# Patient Record
Sex: MALE | Race: WHITE | Employment: FULL TIME | URBAN - METROPOLITAN AREA
[De-identification: names, ages, dates, MRNs, and addresses within clinical notes are randomized per-mention and may not be internally consistent; named-entity substitution may affect disease eponyms.]

---

## 2018-08-10 ENCOUNTER — HOSPITAL ENCOUNTER (OUTPATIENT)
Dept: PHYSICAL THERAPY | Age: 63
Discharge: HOME | End: 2018-08-10
Payer: COMMERCIAL

## 2018-08-10 PROCEDURE — 97161 PT EVAL LOW COMPLEX 20 MIN: CPT | Performed by: PHYSICAL THERAPIST

## 2018-08-10 PROCEDURE — 97535 SELF CARE MNGMENT TRAINING: CPT | Performed by: PHYSICAL THERAPIST

## 2018-08-10 NOTE — PROGRESS NOTES
..[address] [place] [address] PHYSICAL THERAPY   [facility], [place] [address],[facility], [facility] - Phone: [phone]  Fax: [phone] OF CARE / [facility]  Patient Name: Saima Saleem : 1955   Medical   Diagnosis: L RCR Treatment Diagnosis: Left shoulder pain [M25.512]   Onset Date: 18     Referral Source: Citizen of the [place] [facility]): 8/10/2018   Prior Hospitalization: See medical history Provider #: [id number]   Prior Level of Function: No limitations related to L shouler   Comorbidities: Dm, arthritis, high blood pressure   Medications: Verified on Patient Summary List   The Plan of Care and following information is based on the information from the initial evaluation.   ===========================================================================================  Assessment / key information:  58 RHD M arrives to clinic s/p massive RCR on 18. Reports ELYSSA fall on ice in 2018. Rates pain 5-10/10 depending on medication use. Demo understanding of post op protocol. Objective findings: wrist ext limited 15 degrees, elbow prom wnl, shoulder prom flex 42, abd 12, er neutral, ir abdomen. All other tests deferred. Incisions dry and intact with no red flags.  Will attempt PT to address problem list below in order to restore pt overall function.   ===========================================================================================  Eval Complexity: History MEDIUM  Complexity : 1-2 comorbidities / personal factors will impact the outcome/ POC ;  Examination  HIGH Complexity : 4+ Standardized tests and measures addressing body structure, function, activity limitation and / or participation in recreation ; Presentation LOW Complexity : Stable, uncomplicated ;  Decision Making HIGH Complexity : FOTO score of 1- 25 ; Overall Complexity LOW   Problem List: pain affecting function, decrease ROM, decrease strength, decrease ADL/ functional abilitiies, decrease activity tolerance, decrease flexibility/ joint mobility and decrease transfer abilities   Treatment Plan may include any combination of the following: Therapeutic exercise, Therapeutic activities, Neuromuscular re-education, Physical agent/modality, Manual therapy, Patient education and Self Care training  Patient / Family readiness to learn indicated by: asking questions, trying to perform skills and interest  Persons(s) to be included in education: patient (P) and family support person (FSP);list wife  Barriers to Learning/Limitations: None  Measures taken:    Patient Goal (s): Decrease pain, restore use of shoulder   Patient self reported health status: good  Rehabilitation Potential: good   Short Term Goals: To be accomplished in  2  weeks:  1. Pt will be compliant with hep  2. Pt will be able to don/doff sling I  3. Pt will note daily max pain </=8/10 in order to increase participation in  [address]: To be accomplished in  4  weeks:  1. Pt will increase er prom 25 degrees to A with adls  2. Pt will increase prom elevation 90 degrees to A with adls  3. Pt will increase FOTO by 12 points in order to show functional improvement   Frequency / Duration:   Patient to be seen  1  times per week for 4  weeks:  Patient / Caregiver education and instruction: self care, activity modification, brace/ splint application and exercises  G-Codes (GP): benny  Therapist Signature: Erik Clarke DPT, [facility] Date: 6/29/4500   Certification Period: na Time: 11:59 AM   ===========================================================================================  I certify that the above Physical Therapy Services are being furnished while the patient is under my care. I agree with the treatment plan and certify that this therapy is necessary.     Physician Signature:        Date:       Time:     Please sign and return to [facility]. or you may fax the signed copy to [phone]. Thank you.

## 2018-08-10 NOTE — PROGRESS NOTES
[facility] PHYSICAL THERAPY - DAILY TREATMENT NOTE    Patient Name: Krishna Castellanos        Date: 8/10/2018  : 1955   yes Patient  Verified  Visit #:     Insurance: Payor: Sandoval Short / Plan: [employer] [place] HMO / Product Type: HMO /      In time: 940 Out time: 1025   Total Treatment Time: 45     Medicare/BCBS Time Tracking (below)   Total Timed Codes (min):  na 1:1 Treatment Time:  a     TREATMENT AREA =  Left shoulder pain [M25.512]    SUBJECTIVE  Pain Level (on 0 to 10 scale):  5  / 10   Medication Changes/New allergies or changes in medical history, any new surgeries or procedures?    no  If yes, update Summary List   Subjective Functional Status/Changes:  []  No changes reported     See ie          OBJECTIVE         min Patient Education:  yes  Reviewed HEP   []  Progressed/Changed HEP based on: Other Objective/Functional Measures:    Sc: reviewed hep, wound care and don/doff sling  See ie     Post Treatment Pain Level (on 0 to 10) scale:   5  / 10     ASSESSMENT  Assessment/Changes in Function:     See ie     []  See Progress Note/Recertification   Patient will continue to benefit from skilled PT services to modify and progress therapeutic interventions, address functional mobility deficits, address ROM deficits, address strength deficits, analyze and address soft tissue restrictions, analyze and cue movement patterns, analyze and modify body mechanics/ergonomics, assess and modify postural abnormalities and instruct in home and community integration to attain remaining goals.    Progress toward goals / Updated goals:    See ie     PLAN  []  Upgrade activities as tolerated yes Continue plan of care   []  Discharge due to :    []  Other:      Therapist: Amol Light PT    Date: 8/10/2018 Time: 11:58 AM     Future Appointments  Date Time Provider Alex Bailey   2018 9:00 AM [address] [place] [address], PT [facility]   2018 2:00 PM Amol Light PT [facility]   2018 11:00 AM [address] [place] [address], PT [facility]   9/5/2018 10:00 AM Moni Sepulveda, PT [facility]

## 2018-08-16 ENCOUNTER — HOSPITAL ENCOUNTER (OUTPATIENT)
Dept: PHYSICAL THERAPY | Age: 63
Discharge: HOME | End: 2018-08-16
Payer: COMMERCIAL

## 2018-08-16 PROCEDURE — 97110 THERAPEUTIC EXERCISES: CPT

## 2018-08-16 PROCEDURE — 97140 MANUAL THERAPY 1/> REGIONS: CPT

## 2018-08-16 NOTE — PROGRESS NOTES
PHYSICAL THERAPY - DAILY TREATMENT NOTE    Patient Name: Criss Mcclendon        Date: 2018  : 1955   yes Patient  Verified  Visit #:   2   of   8  Insurance: Payor: Tejal Seth / Plan: Ronold Buerger PPO / Product Type: PPO /      In time: 8:50 Out time: 9:41   Total Treatment Time: 51     Medicare/[facility] Time Tracking (below)   Total Timed Codes (min):  NA 1:1 Treatment Time:  NA     TREATMENT AREA =  Left shoulder pain [M25.512]    SUBJECTIVE  Pain Level (on 0 to 10 scale):  3  / 10   Medication Changes/New allergies or changes in medical history, any new surgeries or procedures?    no  If yes, update Summary List   Subjective Functional Status/Changes:  []  No changes reported     Pt reports his shoulder has been sore but no significant sharp pain. Reports intermittent shooting pain down the L upper arm into the elbow. Reports compliance to HEP, post-op restrictions, and sling wear. Reports independence w/ doffing sling but requires assist from wife to doff. OBJECTIVE  Modalities Rationale:     decrease inflammation, decrease pain and increase tissue extensibility to improve patient's ability to complete self-care   min [] Estim, type/location:                                      []  att     []  unatt     []  w/US     []  w/ice    []  w/heat    min []  Mechanical Traction: type/lbs                   []  pro   []  sup   []  int   []  cont    []  before manual    []  after manual    min []  Ultrasound, settings/location:      min []  Iontophoresis w/ dexamethasone, location:                                               []  take home patch       []  in clinic   10/10 min [x]  Ice post  [x]  Heat  pre  location/position:  To the L shoulder in long sit    min []  Vasopneumatic Device, press/temp:     min []  Other:    [x] Skin assessment post-treatment (if applicable):    [x]  intact    []  redness- no adverse reaction     []redness  adverse reaction:        16 min Therapeutic Exercise:  [x]  See flow sheet   Rationale:      increase ROM to improve the patients ability to don/doff sling     15 min Manual Therapy: stm to L post cuff, ut, lev scap; inf/post ghj mob gr II, PROM flex, er w/in protocol   Rationale:      decrease pain, increase ROM and increase tissue extensibility to improve patient's ability to complete self-care     min Patient Education:  yes  Reviewed HEP   []  Progressed/Changed HEP based on: Other Objective/Functional Measures:    Sig ttp to post cuff and lev scap  Cueing to reduce guarding t/o manual tx and pendulums  Adjusted sling to improve elbow supprort     Post Treatment Pain Level (on 0 to 10) scale:   1  / 10     ASSESSMENT  Assessment/Changes in Function:     Pt reported dec pain post tx session. Advised cont of hep as rx and reviewed post op restrictions     []  See Progress Note/Recertification   Patient will continue to benefit from skilled PT services to modify and progress therapeutic interventions, address functional mobility deficits, address ROM deficits, address strength deficits, analyze and address soft tissue restrictions, analyze and cue movement patterns, analyze and modify body mechanics/ergonomics and instruct in home and community integration to attain remaining goals.    Progress toward goals / Updated goals:    Met STG #1, slow progress to STG #2,,      PLAN  []  Upgrade activities as tolerated yes Continue plan of care   []  Discharge due to :    []  Other:      Therapist: Fiona López PT    Date: 8/16/2018 Time: 8:54 AM     Future Appointments  Date Time Provider Alex Bailey   8/16/2018 9:00 AM Fiona López PT [facility]   8/22/2018 2:00 PM Caterina Bernard PT [facility]   8/30/2018 11:00 AM Fiona López PT [facility]   9/5/2018 10:00 AM Caterina Bernard, PT [facility]

## 2018-08-22 ENCOUNTER — HOSPITAL ENCOUNTER (OUTPATIENT)
Dept: PHYSICAL THERAPY | Age: 63
End: 2018-08-22
Payer: COMMERCIAL

## 2018-08-30 ENCOUNTER — APPOINTMENT (OUTPATIENT)
Dept: PHYSICAL THERAPY | Age: 63
End: 2018-08-30
Payer: COMMERCIAL

## 2018-09-05 ENCOUNTER — HOSPITAL ENCOUNTER (OUTPATIENT)
Dept: PHYSICAL THERAPY | Age: 63
Discharge: HOME | End: 2018-09-05
Payer: COMMERCIAL

## 2018-09-05 PROCEDURE — 97110 THERAPEUTIC EXERCISES: CPT | Performed by: PHYSICAL THERAPIST

## 2018-09-05 PROCEDURE — 97140 MANUAL THERAPY 1/> REGIONS: CPT | Performed by: PHYSICAL THERAPIST

## 2018-09-05 NOTE — PROGRESS NOTES
[facility] PHYSICAL THERAPY - DAILY TREATMENT NOTE Patient Name: Princess Cid        Date: 2018 : 1955   yes Patient  Verified Visit #:   3   of   8  Insurance: Payor: OPTIMA / [place]: [place] [employer] / Product Type: PPO / In time: 955 Out time: 1054 Total Treatment Time: 54 Medicare/[facility] Time Tracking (below) Total Timed Codes (min):  na 1:1 Treatment Time:  na  
 
TREATMENT AREA =  Left shoulder pain [M25.512] SUBJECTIVE Pain Level (on 0 to 10 scale):  2  / 10 Medication Changes/New allergies or changes in medical history, any new surgeries or procedures?    no  If yes, update Summary List  
Subjective Functional Status/Changes:  []  No changes reported I have been doing just the exercises from the IE and staying in my sling and I have not been feeling too bad. I can lay on my back in bed, shower myself and take my sling on and off. OBJECTIVE Modalities Rationale:     decrease inflammation, decrease pain and increase tissue extensibility to improve patient's ability to don/doff sling  
 min - Estim, type/location:    
                                 -  att     -  unatt     -  w/US     -  w/ice    -  w/heat  
 min -  Mechanical Traction: type/lbs -  pro   -  sup   -  int   -  cont    -  before manual    -  after manual  
 min -  Ultrasound, settings/location:    
 min -  Iontophoresis w/ dexamethasone, location:   
                                           -  take home patch       -  in clinic  
10 min [x]  Ice     [x]  Heat    location/position: Long sit  
 min []  Vasopneumatic Device, press/temp:   
 min []  Other:   
[x] Skin assessment post-treatment (if applicable):   
[x]  intact    []  redness- no adverse reaction    
[]redness  adverse reaction:     
 
10 min Therapeutic Exercise:  [x]  See flow sheet Rationale:      increase ROM to improve the patients ability to don/doff sling 25 min Manual Therapy: Wrist, elbow and shoulder prom per protocol, grade I post mob Rationale:      decrease pain, increase ROM, increase tissue extensibility and decrease trigger points to improve patient's ability to complete adls 
 
 min Patient Education:  yes  Reviewed HEP []  Progressed/Changed HEP based on: Other Objective/Functional Measures: Mh, mt, te followed by cp Pt able to lay supine with towel under elbow for mt - achieved full elbow ext without pain Cues to maintain codman without excessive swing and able to achieve Post Treatment Pain Level (on 0 to 10) scale:   1  / 10 ASSESSMENT Assessment/Changes in Function:  
 
Continues to maintain good prom these stage post op 
  
[]  See Progress Note/Recertification Patient will continue to benefit from skilled PT services to modify and progress therapeutic interventions, address functional mobility deficits, address ROM deficits, address strength deficits, analyze and address soft tissue restrictions, analyze and cue movement patterns, analyze and modify body mechanics/ergonomics, assess and modify postural abnormalities and instruct in home and community integration to attain remaining goals. Progress toward goals / Updated goals: 
 
Progress towards ltg #1 PLAN 
[]  Upgrade activities as tolerated yes Continue plan of care  
[]  Discharge due to :   
[]  Other:   
 
Therapist: Rajan Smith, PT Date: 9/5/2018 Time: 10:47 AM  
 
Future Appointments Date Time Provider Alex Bailey 9/12/2018 9:30 AM Rajan Smith PT [address] [place] [address]  
9/19/2018 10:00 AM DAYLIN [facility]  
9/26/2018 10:00 AM Rajan Smith PT [address] [place] [address]

## 2018-09-12 ENCOUNTER — HOSPITAL ENCOUNTER (OUTPATIENT)
Dept: PHYSICAL THERAPY | Age: 63
Discharge: HOME | End: 2018-09-12
Payer: COMMERCIAL

## 2018-09-12 PROCEDURE — 97110 THERAPEUTIC EXERCISES: CPT | Performed by: PHYSICAL THERAPIST

## 2018-09-12 PROCEDURE — 97140 MANUAL THERAPY 1/> REGIONS: CPT | Performed by: PHYSICAL THERAPIST

## 2018-09-12 NOTE — PROGRESS NOTES
[facility] PHYSICAL THERAPY - DAILY TREATMENT NOTE Patient Name: Alvin Beach        Date: 2018 : 1955   yes Patient  Verified Visit #:   4   of   8  Insurance: Payor: OPTIMA / [place]: [place] [employer] / Product Type: PPO / In time: 930 Out time: 1028 Total Treatment Time: 46 Medicare/[facility] Time Tracking (below) Total Timed Codes (min):  na 1:1 Treatment Time:  na  
 
TREATMENT AREA =  Left shoulder pain [M25.512] SUBJECTIVE Pain Level (on 0 to 10 scale):  1  / 10 Medication Changes/New allergies or changes in medical history, any new surgeries or procedures?    no  If yes, update Summary List  
Subjective Functional Status/Changes:  []  No changes reported See pn OBJECTIVE Modalities Rationale:     decrease inflammation, decrease pain and increase tissue extensibility to improve patient's ability to don/doff sling  
 min - Estim, type/location:    
                                 -  att     -  unatt     -  w/US     -  w/ice    -  w/heat  
 min -  Mechanical Traction: type/lbs -  pro   -  sup   -  int   -  cont    -  before manual    -  after manual  
 min -  Ultrasound, settings/location:    
 min -  Iontophoresis w/ dexamethasone, location:   
                                           -  take home patch       -  in clinic  
10 min [x]  Ice     [x]  Heat    location/position: Long sit  
 min []  Vasopneumatic Device, press/temp:   
 min []  Other:   
[x] Skin assessment post-treatment (if applicable):   
[x]  intact    []  redness- no adverse reaction    
[]redness  adverse reaction:     
 
9 min Therapeutic Exercise:  [x]  See flow sheet Rationale:      increase ROM to improve the patients ability to don/doff sling 23 min Manual Therapy: Wrist, elbow and shoulder prom per protocol, grade I post mob Rationale:      decrease pain, increase ROM, increase tissue extensibility and decrease trigger points to improve patient's ability to complete adls min Patient Education:  yes  Reviewed HEP []  Progressed/Changed HEP based on: Other Objective/Functional Measures: 
 
See pn  
  
Post Treatment Pain Level (on 0 to 10) scale:   1  / 10 ASSESSMENT Assessment/Changes in Function:  
 
See pn  
  
[]  See Progress Note/Recertification Patient will continue to benefit from skilled PT services to modify and progress therapeutic interventions, address functional mobility deficits, address ROM deficits, address strength deficits, analyze and address soft tissue restrictions, analyze and cue movement patterns, analyze and modify body mechanics/ergonomics, assess and modify postural abnormalities and instruct in home and community integration to attain remaining goals. Progress toward goals / Updated goals: 
 
See pn  
 
PLAN 
[]  Upgrade activities as tolerated yes Continue plan of care  
[]  Discharge due to :   
[]  Other:   
 
Therapist: Kyle Arriaza PT Date: 9/12/2018 Time: 10:47 AM  
 
Future Appointments Date Time Provider Alex Bailey 9/19/2018 10:00 AM Kyle Arriaza, PT [address] [place] [address]  
9/26/2018 10:00 AM Kyle Arriaza, PT [address] [place] [address]  
9/28/2018 2:00 PM Kyle Arriaza PT [facility]  
10/1/2018 10:00 AM Kyle Arriaza, PT [facility]  
10/3/2018 10:00 AM Kyle Arriaza, PT [facility]  
10/8/2018 10:00 AM Kyle Arriaza, PT [facility]  
10/10/2018 10:00 AM Kyle Arriaza, PT [address] [place] [address]

## 2018-09-12 NOTE — PROGRESS NOTES
.[facility] [address] [facility] THERAPY 
[address],[facility], [address] [facility] [address] - Phone: [phone]  Fax: [phone] PROGRESS NOTE Patient Name: Erica Rodgers : 1955 Treatment/Medical Diagnosis: Left shoulder pain [M25.512] Referral Source: Ruby Gifford Date of Initial Visit: 8/10/18 Attended Visits: 4 Missed Visits: 0  
SUMMARY OF TREATMENT Pt has been seen for IE and 3 f/u visits with treatment consisting of therapeutic exercises, manual therapy and modalities prn. In addition pt and wife were instructed on brace fitting and hep. CURRENT STATUS Pt has made good progress with PT. PROM: flex 92 abd 65 er 29 ir abdomen, elbow arom full, wrist arom limited in ext 10 degrees all others wnl. All other tests deferred. Would like to continue with therapy into phase II of program at 6 week sabino. Goal/Measure of Progress Goal Met? 1. Pt will increase PROM er 25 degrees to A with adls Status at last Eval: neutral Current Status: 29 yes 2. Pt will increase prom elevation 90 degrees to A with adls Status at last Eval: 45 Current Status: 92 yes 3. Pt will increase FOTO by 12 points in order to show functional improvement Status at last Eval:  Current Status: 49/100 yes New Goals to be achieved in __4__  weeks: 1. Pt will increase prom elevation >/=120 to A with reaching 2. Pt will increase FOTO an additional 10 points in order to show functional improvement 3. Pt will increase shoulder arom elevation >/=75 degrees to A with reaching RECOMMENDATIONS Continue with therapy an additional 2x/4 weeks If you have any questions/comments please contact us directly at [phone]. Thank you for allowing us to assist in the care of your patient. Therapist Signature: Dami Lei DPT, [facility]  Date: 2018   Time: 8:08 AM  
NOTE TO PHYSICIAN:  PLEASE COMPLETE THE ORDERS BELOW AND FAX TO  
 [facility]: [phone] If you are unable to process this request in 24 hours please contact our office: [phone] 
 
___ I have read the above report and request that my patient continue as recommended.  
___ I have read the above report and request that my patient continue therapy with the following changes/special instructions:_________________________________________________________  
___ I have read the above report and request that my patient be discharged from therapy.   
 
Physician Signature:       Date:      Time:

## 2018-09-19 ENCOUNTER — HOSPITAL ENCOUNTER (OUTPATIENT)
Dept: PHYSICAL THERAPY | Age: 63
Discharge: HOME | End: 2018-09-19
Payer: COMMERCIAL

## 2018-09-19 PROCEDURE — 97140 MANUAL THERAPY 1/> REGIONS: CPT | Performed by: PHYSICAL THERAPIST

## 2018-09-19 PROCEDURE — 97110 THERAPEUTIC EXERCISES: CPT | Performed by: PHYSICAL THERAPIST

## 2018-09-19 NOTE — PROGRESS NOTES
[facility] PHYSICAL THERAPY - DAILY TREATMENT NOTE Patient Name: Padmini Leo        Date: 2018 : 1955   yes Patient  Verified Visit #:      of   16  Insurance: Payor: Coral Higuera / [place]: [place] PPO / Product Type: PPO / In time: 1000 Out time: 1055 Total Treatment Time: 54 Medicare/BCBS Time Tracking (below) Total Timed Codes (min):  na 1:1 Treatment Time:  na  
 
TREATMENT AREA =  Left shoulder pain [M25.512] SUBJECTIVE Pain Level (on 0 to 10 scale):  1  / 10 Medication Changes/New allergies or changes in medical history, any new surgeries or procedures?    no  If yes, update Summary List  
Subjective Functional Status/Changes:  []  No changes reported I saw the doctor and he was really impressed by my motion - gave me an additional 6 weeks of therapy, told me I can drive and to get rid of the sling. Last night I think I must have jolted in my sleep because I had pain in my arm that woke me up. It eventually went away OBJECTIVE Modalities Rationale:     decrease inflammation, decrease pain and increase tissue extensibility to improve patient's ability to don/doff sling  
 min - Estim, type/location:    
                                 -  att     -  unatt     -  w/US     -  w/ice    -  w/heat  
 min -  Mechanical Traction: type/lbs -  pro   -  sup   -  int   -  cont    -  before manual    -  after manual  
 min -  Ultrasound, settings/location:    
 min -  Iontophoresis w/ dexamethasone, location:   
                                           -  take home patch       -  in clinic  
10 min [x]  Ice     [x]  Heat    location/position: Long sit  
 min []  Vasopneumatic Device, press/temp:   
 min []  Other:   
[x] Skin assessment post-treatment (if applicable):   
[x]  intact    []  redness- no adverse reaction    
[]redness  adverse reaction:     
 
15 min Therapeutic Exercise:  [x]  See flow sheet Rationale:      increase ROM to improve the patients ability to don/doff sling 20 min Manual Therapy: Wrist, elbow and shoulder prom per protocol, grade I post mob Rationale:      decrease pain, increase ROM, increase tissue extensibility and decrease trigger points to improve patient's ability to complete adls 
 
 min Patient Education:  yes  Reviewed HEP []  Progressed/Changed HEP based on: Other Objective/Functional Measures: 
 
Performed te as per flow sheet followed by mt - at that time pt reported an increase in overall mm soreness - reviewed doms with pt who demo verbal understanding Post Treatment Pain Level (on 0 to 10) scale:   1  / 10 ASSESSMENT Assessment/Changes in Function: No increase in pain following exercise progression   
[]  See Progress Note/Recertification Patient will continue to benefit from skilled PT services to modify and progress therapeutic interventions, address functional mobility deficits, address ROM deficits, address strength deficits, analyze and address soft tissue restrictions, analyze and cue movement patterns, analyze and modify body mechanics/ergonomics, assess and modify postural abnormalities and instruct in home and community integration to attain remaining goals. Progress toward goals / Updated goals: 
 
Progressed to next phase in protocol and given an advanced hep PLAN 
[]  Upgrade activities as tolerated yes Continue plan of care  
[]  Discharge due to :   
[]  Other:   
 
Therapist: Sydni Fang PT Date: 9/19/2018 Time: 10:47 AM  
 
Future Appointments Date Time Provider Alex Bailey 9/26/2018 10:00 AM Sydni Fang PT [address] [place] [address]  
9/28/2018 2:00 PM Sydni Fang PT [facility]  
10/1/2018 10:00 AM Sydni Fang PT [facility]  
10/3/2018 10:00 AM Sydni Fang PT [facility]  
10/8/2018 10:00 AM Sydni Fang PT [facility]  
10/10/2018 10:00 AM Sydni Fang PT [address] [place] [address]

## 2018-09-26 ENCOUNTER — HOSPITAL ENCOUNTER (OUTPATIENT)
Dept: PHYSICAL THERAPY | Age: 63
Discharge: HOME | End: 2018-09-26
Payer: COMMERCIAL

## 2018-09-26 PROCEDURE — 97140 MANUAL THERAPY 1/> REGIONS: CPT | Performed by: PHYSICAL THERAPIST

## 2018-09-26 PROCEDURE — 97110 THERAPEUTIC EXERCISES: CPT | Performed by: PHYSICAL THERAPIST

## 2018-09-26 NOTE — PROGRESS NOTES
[facility] PHYSICAL THERAPY - DAILY TREATMENT NOTE Patient Name: Benito Porras        Date: 2018 : 1955   yes Patient  Verified Visit #:   6      16  Insurance: Payor: Mode Cooper / [place] / Product Type: PPO / In time: 955 Out time: 1050 Total Treatment Time: 54 Medicare/BCBS Time Tracking (below) Total Timed Codes (min):  na 1:1 Treatment Time:  na  
 
TREATMENT AREA =  Left shoulder pain [M25.512] SUBJECTIVE Pain Level (on 0 to 10 scale): 2  / 10 Medication Changes/New allergies or changes in medical history, any new surgeries or procedures?    no  If yes, update Summary List  
Subjective Functional Status/Changes:  []  No changes reported Just feels like a general ache especially after sleeping. I just cannot get comfortable at night OBJECTIVE Modalities Rationale:     decrease inflammation, decrease pain and increase tissue extensibility to improve patient's ability to don/doff sling  
 min - Estim, type/location:    
                                 -  att     -  unatt     -  w/US     -  w/ice    -  w/heat  
 min -  Mechanical Traction: type/lbs -  pro   -  sup   -  int   -  cont    -  before manual    -  after manual  
 min -  Ultrasound, settings/location:    
 min -  Iontophoresis w/ dexamethasone, location:   
                                           -  take home patch       -  in clinic  
10 min [x]  Ice     []  Heat    location/position: Long sit  
 min []  Vasopneumatic Device, press/temp:   
 min []  Other:   
[x] Skin assessment post-treatment (if applicable):   
[x]  intact    []  redness- no adverse reaction    
[]redness  adverse reaction:     
 
25 min Therapeutic Exercise:  [x]  See flow sheet Rationale:      increase ROM to improve the patients ability to don/doff sling 20 min Manual Therapy: Shoulder prom all directions, grade I-ii ghj post mob, dtm posterior cuff and ut Rationale:      decrease pain, increase ROM, increase tissue extensibility and decrease trigger points to improve patient's ability to complete adls 
 
 min Patient Education:  yes  Reviewed HEP []  Progressed/Changed HEP based on: Other Objective/Functional Measures: 
 
Increased aarom and prom as per protocol - guarding into all prom ir 
Palpable trp along mid ut and post cuff Post Treatment Pain Level (on 0 to 10) scale:   1  / 10 ASSESSMENT Assessment/Changes in Function:  
Pt advised on doms despite no increase in pain following progression. To f/U in clinic 48 hours and hold on any painful hep 
  
[]  See Progress Note/Recertification Patient will continue to benefit from skilled PT services to modify and progress therapeutic interventions, address functional mobility deficits, address ROM deficits, address strength deficits, analyze and address soft tissue restrictions, analyze and cue movement patterns, analyze and modify body mechanics/ergonomics, assess and modify postural abnormalities and instruct in home and community integration to attain remaining goals. Introduced mew exercises for progress towards established goal   
 
PLAN 
[]  Upgrade activities as tolerated yes Continue plan of care  
[]  Discharge due to :   
[]  Other:   
 
Therapist: Maryellen Thomas PT Date: 9/26/2018 Time: 10:47 AM  
 
Future Appointments Date Time Provider Alex Bailey 9/26/2018 10:00 AM Maryellen Thomas PT [facility]  
9/28/2018 2:00 PM Maryellen Thomas PT [facility]  
10/1/2018 10:00 AM Maryellen Thomas PT [facility]  
10/3/2018 10:00 AM Maryellen Thomas PT [facility]  
10/8/2018 10:00 AM Maryellen Thomas PT [facility]  
10/10/2018 10:00 AM Maryellen Thomas PT [facility]

## 2018-09-28 ENCOUNTER — HOSPITAL ENCOUNTER (OUTPATIENT)
Dept: PHYSICAL THERAPY | Age: 63
Discharge: HOME | End: 2018-09-28
Payer: COMMERCIAL

## 2018-09-28 PROCEDURE — 97140 MANUAL THERAPY 1/> REGIONS: CPT | Performed by: PHYSICAL THERAPIST

## 2018-09-28 PROCEDURE — 97110 THERAPEUTIC EXERCISES: CPT | Performed by: PHYSICAL THERAPIST

## 2018-09-28 NOTE — PROGRESS NOTES
[facility] PHYSICAL THERAPY - DAILY TREATMENT NOTE Patient Name: Augustin Durham        Date: 2018 : 1955   yes Patient  Verified Visit #:     Insurance: Payor: OPTIMA / Plan: [place] [employer] / Product Type: PPO / In time: 200 Out time: 255 Total Treatment Time: 54 Medicare/BCBS Time Tracking (below) Total Timed Codes (min):  na 1:1 Treatment Time:  na  
 
TREATMENT AREA =  Left shoulder pain [M25.512] SUBJECTIVE Pain Level (on 0 to 10 scale): 2  / 10 Medication Changes/New allergies or changes in medical history, any new surgeries or procedures?    no  If yes, update Summary List  
Subjective Functional Status/Changes:  []  No changes reported I have been just sore in my shoulder I think I did something in sleep OBJECTIVE Modalities Rationale:     decrease inflammation, decrease pain and increase tissue extensibility to improve patient's ability to don/doff sling  
 min - Estim, type/location:    
                                 -  att     -  unatt     -  w/US     -  w/ice    -  w/heat  
 min -  Mechanical Traction: type/lbs -  pro   -  sup   -  int   -  cont    -  before manual    -  after manual  
 min -  Ultrasound, settings/location:    
 min -  Iontophoresis w/ dexamethasone, location:   
                                           -  take home patch       -  in clinic  
10 min [x]  Ice     []  Heat    location/position: Long sit  
 min []  Vasopneumatic Device, press/temp:   
 min []  Other:   
[x] Skin assessment post-treatment (if applicable):   
[x]  intact    []  redness- no adverse reaction    
[]redness  adverse reaction:     
 
30 min Therapeutic Exercise:  [x]  See flow sheet Rationale:      increase ROM to improve the patients ability to don/doff sling 15 min Manual Therapy: Shoulder prom all directions, grade I-ii ghj post mob, dtm posterior cuff and ut Rationale:      decrease pain, increase ROM, increase tissue extensibility and decrease trigger points to improve patient's ability to complete adls 
 
 min Patient Education:  yes  Reviewed HEP []  Progressed/Changed HEP based on: Other Objective/Functional Measures: 
Arrived to session with pain reported along superior aspect of shoulder with elevation at 75 degrees, following manual approximately 120 pain free able to complete all te without c/o pain Post Treatment Pain Level (on 0 to 10) scale:   1  / 10 ASSESSMENT Assessment/Changes in Function:  
Progressing in all directions at nearly 8 weeks post op 
  
[]  See Progress Note/Recertification Patient will continue to benefit from skilled PT services to modify and progress therapeutic interventions, address functional mobility deficits, address ROM deficits, address strength deficits, analyze and address soft tissue restrictions, analyze and cue movement patterns, analyze and modify body mechanics/ergonomics, assess and modify postural abnormalities and instruct in home and community integration to attain remaining goals. Introduced mew exercises for progress towards established goal  
Progress towards goal  
 
PLAN 
[]  Upgrade activities as tolerated yes Continue plan of care  
[]  Discharge due to :   
[]  Other:   
 
Therapist: Mike Branch, PT Date: 9/28/2018 Time: 10:47 AM  
 
Future Appointments Date Time Provider Alex Bailey 10/1/2018 10:00 AM Mike Branch PT [address] [place] [address]  
10/3/2018 10:00 AM Mike Branch PT [address] [place] [address]  
10/8/2018 10:00 AM Mike Branch PT [facility]  
10/10/2018 10:00 AM Mike Branch PT [address] [place] [address]

## 2018-10-01 ENCOUNTER — HOSPITAL ENCOUNTER (OUTPATIENT)
Dept: PHYSICAL THERAPY | Age: 63
Discharge: HOME | End: 2018-10-01
Payer: COMMERCIAL

## 2018-10-01 PROCEDURE — 97110 THERAPEUTIC EXERCISES: CPT | Performed by: PHYSICAL THERAPIST

## 2018-10-01 PROCEDURE — 97140 MANUAL THERAPY 1/> REGIONS: CPT | Performed by: PHYSICAL THERAPIST

## 2018-10-01 NOTE — PROGRESS NOTES
[facility] PHYSICAL THERAPY - DAILY TREATMENT NOTE Patient Name: Corwin Nair        Date: 10/1/2018 : 1955   yes Patient  Verified Visit #:     Insurance: Payor: OPTIMA / Plan: [place] [employer] PPO / Product Type: PPO / In time: 1001 Out time: 1100 Total Treatment Time: 62 Medicare/BCBS Time Tracking (below) Total Timed Codes (min):  na 1:1 Treatment Time:  na  
 
TREATMENT AREA =  Left shoulder pain [M25.512] SUBJECTIVE Pain Level (on 0 to 10 scale):  1  / 10 Medication Changes/New allergies or changes in medical history, any new surgeries or procedures?    no  If yes, update Summary List  
Subjective Functional Status/Changes:  []  No changes reported I am feeling much better than I did on Friday. I was just really really sore. I did try and vacuum over the weekend and I realized that my shoulder is not ready for that OBJECTIVE Modalities Rationale:     decrease inflammation, decrease pain and increase tissue extensibility to improve patient's ability to don/doff sling  
 min - Estim, type/location:    
                                 -  att     -  unatt     -  w/US     -  w/ice    -  w/heat  
 min -  Mechanical Traction: type/lbs -  pro   -  sup   -  int   -  cont    -  before manual    -  after manual  
 min -  Ultrasound, settings/location:    
 min -  Iontophoresis w/ dexamethasone, location:   
                                           -  take home patch       -  in clinic  
10 min [x]  Ice     []  Heat    location/position: Long sit  
 min []  Vasopneumatic Device, press/temp:   
 min []  Other:   
[x] Skin assessment post-treatment (if applicable):   
[x]  intact    []  redness- no adverse reaction    
[]redness  adverse reaction:     
 
27 min Therapeutic Exercise:  [x]  See flow sheet Rationale:      increase ROM to improve the patients ability to don/doff sling 20 min Manual Therapy: dtm post cuff, grade ii-iii post mob, gentle long axis tx, ghj prom all directions Rationale:      decrease pain, increase ROM, increase tissue extensibility and decrease trigger points to improve patient's ability to complete adls 
 
 min Patient Education:  yes  Reviewed HEP []  Progressed/Changed HEP based on: Other Objective/Functional Measures: Added in both aarom and prom ir - aarom to sacral apex and prom sacral base with anterior pain reported at end range Completed remaining te without c/o Post Treatment Pain Level (on 0 to 10) scale:   0  / 10 ASSESSMENT Assessment/Changes in Function:  
Continuing to progressing rotational prom and aarom flex at 7 weeks post op  
  
[]  See Progress Note/Recertification Patient will continue to benefit from skilled PT services to modify and progress therapeutic interventions, address functional mobility deficits, address ROM deficits, address strength deficits, analyze and address soft tissue restrictions, analyze and cue movement patterns, analyze and modify body mechanics/ergonomics, assess and modify postural abnormalities and instruct in home and community integration to attain remaining goals. Progress toward goals / Updated goals: 
Steady progress towards elevation prom goals - will add in arom per protocol to address ltg #3 PLAN 
[]  Upgrade activities as tolerated yes Continue plan of care  
[]  Discharge due to :   
[]  Other:   
 
Therapist: Nanda Dangelo PT Date: 10/1/2018 Time: 10:47 AM  
 
Future Appointments Date Time Provider Alex Bailey 10/1/2018 10:00 AM Nanda Dangelo PT [address] [place] [address]  
10/3/2018 10:00 AM Nanda Dangelo PT [address] [place] [address]  
10/8/2018 10:00 AM DAYLIN Wellington [facility]  
10/10/2018 10:00 AM Nanda Dangelo PT [address] [place] [address]

## 2018-10-03 ENCOUNTER — HOSPITAL ENCOUNTER (OUTPATIENT)
Dept: PHYSICAL THERAPY | Age: 63
Discharge: HOME | End: 2018-10-03
Payer: COMMERCIAL

## 2018-10-03 PROCEDURE — 97140 MANUAL THERAPY 1/> REGIONS: CPT | Performed by: PHYSICAL THERAPIST

## 2018-10-03 PROCEDURE — 97110 THERAPEUTIC EXERCISES: CPT | Performed by: PHYSICAL THERAPIST

## 2018-10-03 NOTE — PROGRESS NOTES
.. PHYSICAL THERAPY - DAILY TREATMENT NOTE Patient Name: Augustin Durham        Date: 10/3/2018 : 1955   yes Patient  Verified Visit #:     Insurance: Payor: OPTIMA / Plan: [place] OPT[place] PPO / Product Type: PPO / In time: 958 Out time: 5048 Total Treatment Time: 62 Medicare/[facility] Time Tracking (below) Total Timed Codes (min):   1:1 Treatment Time:    
TREATMENT AREA =  Left shoulder pain [M25.512] SUBJECTIVE Pain Level (on 0 to 10 scale):  2  / 10 Medication Changes/New allergies or changes in medical history, any new surgeries or procedures?    no  If yes, update Summary List  
Subjective Functional Status/Changes:  []  No changes reported I had a rough night, I woke up and was lying on that shoulder. So today I am a little sore OBJECTIVE Modalities Rationale:     decrease pain and increase tissue extensibility to improve patient's ability to perform ADLs. min [] Estim, type/location:   
                                 []  att     []  unatt     []  w/US     []  w/ice    []  w/heat 
 min []  Mechanical Traction: type/lbs   
               []  pro   []  sup   []  int   []  cont    []  before manual    []  after manual  
 min []  Ultrasound, settings/location:    
 min []  Iontophoresis w/ dexamethasone, location:   
                                           []  take home patch       []  in clinic  
10 min [x]  Ice     []  Heat    location/position: sitting  
 min []  Vasopneumatic Device, press/temp:   
 min []  Other:   
28 min Therapeutic Exercise:  [x]  See flow sheet Rationale:      increase ROM and increase strength to improve the patients ability to perform ADLs. 20 min Manual Therapy: PROM (flexion, ER, abduction) sidelying IR Rationale:      decrease pain, improve ROM, improve tissue extensibility to improve patient's ability to perform ADLs. min Patient Education:  yes  Reviewed HEP []  Progressed/Changed HEP based on: Other Objective/Functional Measures: 
 
Noted UT/LS compensation with standing flexion in the scapular plane. Noted observational improvement in AROM flexion and ER. Post Treatment Pain Level (on 0 to 10) scale:   1  / 10 ASSESSMENT Assessment/Changes in Function:  
 
Added exercises to further improve AROM in flexion, ER and IR. Pt continuing to have most difficulty with IR in sidelying. []  See Progress Note/Recertification Patient will continue to benefit from skilled PT services to modify and progress therapeutic interventions, address functional mobility deficits, address ROM deficits, address strength deficits, analyze and address soft tissue restrictions and analyze and cue movement patterns to attain remaining goals. Progress toward goals / Updated goals: 
Progress per protocol in order to improve AROM. PLAN 
[]  Upgrade activities as tolerated yes Continue plan of care  
[]  Discharge due to :   
[]  Other:   
 
Therapist: DE Kent DPT, CMT Date: 10/3/2018 Time: 10:00 AM  
 
Future Appointments Date Time Provider Alex Bailey 10/8/2018 10:00 AM Lizandro Clarke, PT [address] [place] [address]  
10/10/2018 10:00 AM Lizandro Clarke, PT [facility]  
10/16/2018 12:30 PM Jackelineona Vince, PT [facility]  
10/18/2018 2:00 PM Jackelineona Greenwood, PT [facility]  
10/23/2018 12:00 PM Lavona Greenwood, PT [facility]  
10/25/2018 2:30 PM Jackelineona Greenwood, PT [facility]  
10/29/2018 2:30 PM Jackelineona Greenwood, PT [facility]  
10/31/2018 2:30 PM Lavona Greenwood, PT [facility]

## 2018-10-08 ENCOUNTER — HOSPITAL ENCOUNTER (OUTPATIENT)
Dept: PHYSICAL THERAPY | Age: 63
Discharge: HOME | End: 2018-10-08
Payer: COMMERCIAL

## 2018-10-08 PROCEDURE — 97140 MANUAL THERAPY 1/> REGIONS: CPT | Performed by: PHYSICAL THERAPIST

## 2018-10-08 PROCEDURE — 97110 THERAPEUTIC EXERCISES: CPT | Performed by: PHYSICAL THERAPIST

## 2018-10-08 NOTE — PROGRESS NOTES
.. PHYSICAL THERAPY - DAILY TREATMENT NOTE Patient Name: Margaret Smith        Date: 10/8/2018 : 1955   yes Patient  Verified Visit #:   10   of   16  Insurance: Payor: Channing Ocasio / Plan: [place] [employer] PPO / Product Type: PPO / In time: 959 Out time: 1059 Total Treatment Time: 60 Medicare/[facility] Time Tracking (below) Total Timed Codes (min):   1:1 Treatment Time:    
 
TREATMENT AREA =  Left shoulder pain [M25.512] SUBJECTIVE Pain Level (on 0 to 10 scale):  1  / 10 Medication Changes/New allergies or changes in medical history, any new surgeries or procedures?    no  If yes, update Summary List  
Subjective Functional Status/Changes:  []  No changes reported I was sore from changing a hose under the sink over the weekend. I had to put my arm in weird places but was cautious of how much I did. OBJECTIVE Modalities Rationale:     decrease pain and increase tissue extensibility to improve patient's ability to perform ADLs 
 min [] Estim, type/location:    
                                 []  att     []  unatt     []  w/US     []  w/ice    []  w/heat  
 min []  Mechanical Traction: type/lbs   
               []  pro   []  sup   []  int   []  cont    []  before manual    []  after manual  
 min []  Ultrasound, settings/location:    
 min []  Iontophoresis w/ dexamethasone, location:   
                                           []  take home patch       []  in clinic  
10 min [x]  Ice     []  Heat    location/position: sitting  
 min []  Vasopneumatic Device, press/temp:   
 min []  Other:   
 
 
35 min Therapeutic Exercise:  [x]  See flow sheet Rationale:      increase ROM, increase strength and improve coordination to improve the patients ability to perform ADLs. 15 min Manual Therapy: PROM (elevation, ER, ABER, sidelying IR) Rationale:      decrease pain, increase ROM and increase tissue extensibility to improve patient's ability to perform ADLs. min Patient Education:  yes  Reviewed HEP []  Progressed/Changed HEP based on: Other Objective/Functional Measures: 
 
Functional IR to L3 with anterior discomfort reported that is alleviated after stretch Post Treatment Pain Level (on 0 to 10) scale:   1  / 10 ASSESSMENT Assessment/Changes in Function:  
 
Observational improvement in ROM with elevation and UT compensation with standing scaption. Added seated body blade for rotator cuff strengthening and improve ability to perform ADLs. []  See Progress Note/Recertification Patient will continue to benefit from skilled PT services to modify and progress therapeutic interventions, address functional mobility deficits, address ROM deficits, address strength deficits, analyze and address soft tissue restrictions and analyze and cue movement patterns to attain remaining goals. Progress toward goals / Updated goals: 
 
Progress per protocol to meet ROM and functional goals. PLAN 
[]  Upgrade activities as tolerated yes Continue plan of care  
[]  Discharge due to :   
[]  Other:   
 
Therapist: DE Junior Date: 10/8/2018 Time: 10:43 AM  
 
Future Appointments Date Time Provider Alex Bailey 10/10/2018 10:00 AM Sarthak Hernández, PT [facility]  
10/16/2018 12:30 PM Sarthak Hernández, PT [facility]  
10/18/2018 2:00 PM Sarthak Hernández, PT [facility]  
10/23/2018 12:00 PM Sarthak Hernández, PT [facility]  
10/25/2018 2:30 PM Sarthak Hernández, PT [facility]  
10/29/2018 2:30 PM Sarthak Hernández, PT [facility]  
10/31/2018 2:30 PM Sarthak Hernández, PT [facility]

## 2018-10-10 ENCOUNTER — HOSPITAL ENCOUNTER (OUTPATIENT)
Dept: PHYSICAL THERAPY | Age: 63
Discharge: HOME | End: 2018-10-10
Payer: COMMERCIAL

## 2018-10-10 PROCEDURE — 97140 MANUAL THERAPY 1/> REGIONS: CPT | Performed by: PHYSICAL THERAPIST

## 2018-10-10 PROCEDURE — 97110 THERAPEUTIC EXERCISES: CPT | Performed by: PHYSICAL THERAPIST

## 2018-10-10 NOTE — PROGRESS NOTES
Cleve Robertson 31  [facility] THERAPY 
[address] [address],[facility], [address] [place] [address] - Phone: [phone]  Fax: [phone] PROGRESS NOTE Patient Name: Riccardo Francois : 1955 Treatment/Medical Diagnosis: Left shoulder pain [M25.512] Referral Source: Keith Leos Date of Initial Visit: 8/10/18 Attended Visits: 11 Missed Visits: 0  
SUMMARY OF TREATMENT Pt has been seen for 11 visits with progressing protocol. Pt is increasing overall rotator cuff recruitment through PROM, AROM, resistance exercises, manual therapy, and modalities. Still no resisted IR in ther ex. Incorporated rhythmic stabilization in elevation and abduction in order to promote increased rotator cuff recruitment/stability with antigravity activities. Pt still experiencing muscle guarding and excessive UT usage with scaption. CURRENT STATUS Pt has made good progress with therapy. AROM elevation 120, PROM elevation 135, functional IR L1, AROM ER 50. Pt reports having the most difficulty with abduction and IR. At this time would like to progress to next phase of protocol (exlcusing IR pres) to address arom/strength deficits Goal/Measure of Progress Goal Met? 1. Pt will increase PROM elevation >/= 120 to A with reaching. Status at last Eval: 92 Current Status: 135 yes 2. Pt will increase FOTO an additional 10 points in order to show functional improvement. Status at last Eval: 49/100 Current Status: 59/100 yes 3. Pt will increase shoulder AROM elevation >/= 75 degrees to A with reaching. Status at last Eval: Not tested Current Status: 120 n/a New Goals to be achieved in __4__  weeks: 1. Pt will increase IR ROM to >/= T12 in order to assist with dressing. 2. Pt will increase FOTO score by 12 points in order to show functional improvement. 3. Pt will increase AROM elevation >/= 120 without UT involvement in order to assist with ADLs. RECOMMENDATIONS Continue with therapy for an additional 2x/6 weeks. If you have any questions/comments please contact us directly at [phone]. Thank you for allowing us to assist in the care of your patient. Therapist Signature: DE Toney Date: 10/10/2018 Jennifer Cooper DPT, [facility] Time: 1:15 PM  
NOTE TO PHYSICIAN:  PLEASE COMPLETE THE ORDERS BELOW AND FAX TO [facility] Physical Therapy: [phone] If you are unable to process this request in 24 hours please contact our office: [phone] 
 
___ I have read the above report and request that my patient continue as recommended.  
___ I have read the above report and request that my patient continue therapy with the following changes/special instructions:_________________________________________________________  
___ I have read the above report and request that my patient be discharged from therapy.   
 
Physician Signature:       Date:      Time:

## 2018-10-10 NOTE — PROGRESS NOTES
.. PHYSICAL THERAPY - DAILY TREATMENT NOTE Patient Name: Jung Gross        Date: 10/10/2018 : 1955   yes Patient  Verified Visit #:     Insurance: Payor: Larry Debra / Plan: [place] PPO / Product Type: PPO / In time: 1003 Out time: 1100 Total Treatment Time: 62 Medicare/[facility] Time Tracking (below) Total Timed Codes (min):   1:1 Treatment Time:    
 
TREATMENT AREA =  Left shoulder pain [M25.512] SUBJECTIVE Pain Level (on 0 to 10 scale):  1  / 10 Medication Changes/New allergies or changes in medical history, any new surgeries or procedures?    no  If yes, update Summary List  
Subjective Functional Status/Changes:  []  No changes reported I feel fine, just a little sore/stiff. OBJECTIVE Modalities Rationale:     decrease inflammation and decrease pain to improve patient's ability to perform ADLs. min [] Estim, type/location:    
                                 []  att     []  unatt     []  w/US     []  w/ice    []  w/heat  
 min []  Mechanical Traction: type/lbs   
               []  pro   []  sup   []  int   []  cont    []  before manual    []  after manual  
 min []  Ultrasound, settings/location:    
 min []  Iontophoresis w/ dexamethasone, location:   
                                           []  take home patch       []  in clinic  
10 min [x]  Ice     []  Heat    location/position: sitting  
 min []  Vasopneumatic Device, press/temp:   
 min []  Other:   
 
 
32 min Therapeutic Exercise:  [x]  See flow sheet Rationale:      increase ROM and increase strength to improve the patients ability to perform ADLs. 15 min Manual Therapy: PROM IR/ER, elevation/rhythmic stabilization 3x30 sec in elevation and abduction Rationale:      decrease pain, increase ROM and increase tissue extensibility to improve patient's ability to perform ADLs. min Patient Education:  yes  Reviewed HEP []  Progressed/Changed HEP based on: Other Objective/Functional Measures: 
 
See pn 
  
Post Treatment Pain Level (on 0 to 10) scale:   1 / 10 ASSESSMENT Assessment/Changes in Function:  
 
Increasing per protocol. []  See Progress Note/Recertification Patient will continue to benefit from skilled PT services to modify and progress therapeutic interventions, address functional mobility deficits, address ROM deficits, address strength deficits, analyze and address soft tissue restrictions and analyze and cue movement patterns to attain remaining goals. Progress toward goals / Updated goals: 
 
Updated, see pn  
 
PLAN [x]  Upgrade activities as tolerated yes Continue plan of care  
[]  Discharge due to :   
[]  Other:   
 
Therapist: DE Pepper Date: 10/10/2018 Time: 2:04 PM  
 
Future Appointments Date Time Provider Alex Bailey 10/16/2018 12:30 PM Avani Borja, PT [address] [place] [address]  
10/18/2018 2:00 PM Avani Borja, PT [facility]  
10/23/2018 12:00 PM Avani Borja, PT [facility]  
10/25/2018 2:30 PM Avani Borja, PT [facility]  
10/29/2018 2:30 PM Avani Borja, PT [facility]  
10/31/2018 2:30 PM Avani Borja, PT [facility]  
11/2/2018 8:30 AM Avani Borja, PT [facility]  
11/7/2018 9:30 AM Avani Borja, PT [facility]  
11/9/2018 9:00 AM Avani Borja, PT [facility]  
11/13/2018 10:30 AM Avani Borja, PT [facility]  
11/15/2018 10:30 AM Avani Borja, PT [facility]  
11/19/2018 10:00 AM Avani Borja, PT [facility]  
11/21/2018 10:00 AM Avani Borja, PT [facility]  
11/26/2018 10:00 AM Avani Borja, PT [facility]  
11/28/2018 10:00 AM Avani Borja, PT [facility]

## 2018-10-16 ENCOUNTER — HOSPITAL ENCOUNTER (OUTPATIENT)
Dept: PHYSICAL THERAPY | Age: 63
Discharge: HOME | End: 2018-10-16
Payer: COMMERCIAL

## 2018-10-16 PROCEDURE — 97140 MANUAL THERAPY 1/> REGIONS: CPT | Performed by: PHYSICAL THERAPIST

## 2018-10-16 PROCEDURE — 97110 THERAPEUTIC EXERCISES: CPT | Performed by: PHYSICAL THERAPIST

## 2018-10-16 NOTE — PROGRESS NOTES
[facility] Sp[facility] PHYSICAL THERAPY - DAILY TREATMENT NOTE Patient Name: Natan Christianson        Date: 10/16/2018 : 1955   yes Patient  Verified Visit #:   15   of   25  Insurance: Payor: Elaine Zheng / Plan: [place] PPO / Product Type: PPO / In time: 1229 Out time: 115 Total Treatment Time: 45 Medicare/[facility] Time Tracking (below) Total Timed Codes (min):  na 1:1 Treatment Time:  na  
TREATMENT AREA =  Left shoulder pain [M25.512] SUBJECTIVE Pain Level (on 0 to 10 scale):  1  / 10 Medication Changes/New allergies or changes in medical history, any new surgeries or procedures?    no  If yes, update Summary List  
Subjective Functional Status/Changes:  []  No changes reported I did a lot over the weekend so I have a lot of just mm soreness OBJECTIVE 30 min Therapeutic Exercise:  [x]  See flow sheet Rationale:      increase ROM and increase strength to improve the patients ability to complete adls 15 min Manual Therapy: Dtm/stretch posterior cuff, ghj prom all directions, grade iii post mob Rationale:      decrease pain, increase ROM, increase tissue extensibility and decrease trigger points to improve patient's ability to lift/reach  
 min Patient Education:  yes  Reviewed HEP []  Progressed/Changed HEP based on: Other Objective/Functional Measures: 
 
Increased te as per flow sheet to improve pt ability to reach/lift - continuing to hold on resisted ir until cleared by md 
ttp along posterior cuff with palpable trp at mid teres Post Treatment Pain Level (on 0 to 10) scale:   1  / 10 ASSESSMENT Assessment/Changes in Function: No increase in pain/discomfort following progression - continues to UT hike any elevation attempts 120 []  See Progress Note/Recertification Patient will continue to benefit from skilled PT services to modify and progress therapeutic interventions, address functional mobility deficits, address ROM deficits, address strength deficits, analyze and address soft tissue restrictions, analyze and cue movement patterns, analyze and modify body mechanics/ergonomics, assess and modify postural abnormalities and instruct in home and community integration to attain remaining goals. Progress toward goals / Updated goals: 
Continues to make progress towards established goals PLAN 
[]  Upgrade activities as tolerated yes Continue plan of care  
[]  Discharge due to :   
[]  Other:   
 
Therapist: Lacey Adan PT Date: 10/16/2018 Time: 1:00 PM  
 
Future Appointments Date Time Provider Alex Bailey 10/18/2018 2:00 PM Lacey Adan, PT [facility]  
10/23/2018 12:00 PM Lacey Adan, PT [facility]  
10/25/2018 2:30 PM Lacey Adan, PT [facility]  
10/29/2018 2:30 PM Lacey Adan, PT [facility]  
10/31/2018 2:30 PM Lacey Adan, PT [facility]  
11/2/2018 8:30 AM Lacey Adan, PT [facility]  
11/7/2018 9:30 AM Lacey Adan, PT [facility]  
11/9/2018 9:00 AM Lacey Adan, PT [facility]  
11/13/2018 10:30 AM Lacey Adan, PT [facility]  
11/15/2018 10:30 AM Lacey Adan, PT [facility]  
11/19/2018 10:00 AM Lacey Adan, PT [facility]  
11/21/2018 10:00 AM Lacey Adan, PT [facility]  
11/26/2018 10:00 AM Lacey Adan, PT [facility]  
11/28/2018 10:00 AM Lacey Adan, PT [facility]

## 2018-10-18 ENCOUNTER — HOSPITAL ENCOUNTER (OUTPATIENT)
Dept: PHYSICAL THERAPY | Age: 63
Discharge: HOME | End: 2018-10-18
Payer: COMMERCIAL

## 2018-10-18 PROCEDURE — 97140 MANUAL THERAPY 1/> REGIONS: CPT | Performed by: PHYSICAL THERAPIST

## 2018-10-18 PROCEDURE — 97110 THERAPEUTIC EXERCISES: CPT | Performed by: PHYSICAL THERAPIST

## 2018-10-18 NOTE — PROGRESS NOTES
.. PHYSICAL THERAPY - DAILY TREATMENT NOTE Patient Name: Shira Melgar        Date: 10/18/2018 : 1955   yes Patient  Verified Visit #:   15   of   18  Insurance: Payor: Bud Arceo / Plan: [place] OPT[place] PPO / Product Type: PPO / In time: 205 Out time: 300 Total Treatment Time: 54 Medicare/[facility] Time Tracking (below) Total Timed Codes (min):  na 1:1 Treatment Time:  na  
TREATMENT AREA =  Left shoulder pain [M25.512] SUBJECTIVE Pain Level (on 0 to 10 scale):  0  / 10 Medication Changes/New allergies or changes in medical history, any new surgeries or procedures?    no  If yes, update Summary List  
Subjective Functional Status/Changes:  []  No changes reported I am just sore from increasing the stuff after last visit. OBJECTIVE 40 min Therapeutic Exercise:  [x]  See flow sheet Rationale:      increase ROM and increase strength to improve the patients ability to perform ADLs. 15 min Manual Therapy: ghj inferior mobs, ER and elevation PROM, STM posterior cuff Rationale:      decrease pain, increase ROM and increase tissue extensibility to improve patient's ability to perform ADLs. min Patient Education:  yes  Reviewed HEP []  Progressed/Changed HEP based on: Other Objective/Functional Measures: 
 
Improved elevation and ER ROM. Noted muscle tension along UT and posterior cuff. Added wall push ups to improve stability and RC strength. Post Treatment Pain Level (on 0 to 10) scale:   0  / 10 ASSESSMENT Assessment/Changes in Function:  
 
Pt is progressing well with PT, continues to have the most difficulty with IR. Will continue to hold resisted IR until cleared by MD.  
[]  See Progress Note/Recertification Patient will continue to benefit from skilled PT services to modify and progress therapeutic interventions, address functional mobility deficits, address ROM deficits, address strength deficits, analyze and address soft tissue restrictions and analyze and cue movement patterns to attain remaining goals. Progress toward goals / Updated goals: 
Progress in order to meet ROM goals. PLAN 
[]  Upgrade activities as tolerated yes Continue plan of care  
[]  Discharge due to :   
[]  Other:   
 
Therapist: DE Ervin Date: 10/18/2018 Time: 3:25 PM  
 
Future Appointments Date Time Provider Alex Bailey 10/23/2018 12:00 PM Kossekristi Beach, PT [facility]  
10/25/2018  2:30 PM Jenna Beach, PT [facility]  
10/29/2018  2:30 PM Jenna Beach, PT [facility]  
10/31/2018  2:30 PM Jenna Beach, PT [facility]  
11/2/2018  8:30 AM Jenna Beach, PT [facility]  
11/7/2018  9:30 AM Jenna Beach, PT [facility]  
11/9/2018  9:00 AM Jenna Beach, PT [facility]  
11/13/2018 10:30 AM Jenna Beach, PT [facility]  
11/15/2018 10:30 AM Jenna Beach, PT [facility]  
11/19/2018 10:00 AM Jenna Beach, PT [facility]  
11/21/2018 10:00 AM Jenna Beach, PT [facility]  
11/26/2018 10:00 AM Jenna Beach, PT [facility]  
11/28/2018 10:00 AM Franck Ferreira, PT [facility]

## 2018-10-23 ENCOUNTER — APPOINTMENT (OUTPATIENT)
Dept: PHYSICAL THERAPY | Age: 63
End: 2018-10-23
Payer: COMMERCIAL

## 2018-10-25 ENCOUNTER — HOSPITAL ENCOUNTER (OUTPATIENT)
Dept: PHYSICAL THERAPY | Age: 63
Discharge: HOME | End: 2018-10-25
Payer: COMMERCIAL

## 2018-10-25 PROCEDURE — 97140 MANUAL THERAPY 1/> REGIONS: CPT | Performed by: PHYSICAL THERAPIST

## 2018-10-25 PROCEDURE — 97110 THERAPEUTIC EXERCISES: CPT | Performed by: PHYSICAL THERAPIST

## 2018-10-25 NOTE — PROGRESS NOTES
.. PHYSICAL THERAPY - DAILY TREATMENT NOTE Patient Name: Zaki Ramirez        Date: 10/25/2018 : 1955   yes Patient  Verified Visit #:     Insurance: Payor: Ra Bautista / Plan: [place] OPT[place] PPO / Product Type: PPO / In time: 230 Out time: 321 Total Treatment Time: 51 Medicare/[facility] Time Tracking (below) Total Timed Codes (min):  na 1:1 Treatment Time:  na  
TREATMENT AREA =  Left shoulder pain [M25.512] SUBJECTIVE Pain Level (on 0 to 10 scale):  0  / 10 Medication Changes/New allergies or changes in medical history, any new surgeries or procedures?    no  If yes, update Summary List  
Subjective Functional Status/Changes:  []  No changes reported I am still sore. I only really have pain if I am sleeping at night and I end up turning on the L shoulder. OBJECTIVE 36 min Therapeutic Exercise:  [x]  See flow sheet Rationale:      increase ROM, increase strength, improve coordination and increase proprioception to improve the patients ability to perform ADLs. 15 min Manual Therapy: PROM elevation, IR, ER, inferior ghj mobs, posterior mobs Rationale:      decrease pain, increase ROM and increase tissue extensibility to improve patient's ability to perform ADLs. min Patient Education:  yes  Reviewed HEP []  Progressed/Changed HEP based on: Other Objective/Functional Measures: 
 
Improving global ghj ROM. ER: 61 degrees Minimal p! With 130 degrees flexion Post Treatment Pain Level (on 0 to 10) scale:   0  / 10 ASSESSMENT Assessment/Changes in Function: Pt progressing very well with PT and is tolerating increasing resistance with exercises. Still avoiding resisted IR until next visit 
  
[]  See Progress Note/Recertification Patient will continue to benefit from skilled PT services to modify and progress therapeutic interventions, address functional mobility deficits, address ROM deficits, address strength deficits, analyze and address soft tissue restrictions and analyze and cue movement patterns to attain remaining goals. Progress toward goals / Updated goals: 
Progress in order to return to PLOF  
 
PLAN 
[]  Upgrade activities as tolerated yes Continue plan of care  
[]  Discharge due to :   
[]  Other:   
 
Therapist: DE Pepper Date: 10/25/2018 Time: 2:27 PM  
 
Future Appointments Date Time Provider Alex Bailey 10/25/2018  2:30 PM Ganga Greenhouse, PT [facility]  
10/29/2018  2:30 PM Ganga Greenhouse, PT [facility]  
10/31/2018  2:30 PM Chester Epps [facility]  
11/2/2018  8:30 AM Ganga Greenhouse, PT [facility]  
11/7/2018  9:30 AM Ganga Greenhouse, PT [facility]  
11/9/2018  9:00 AM Ganga Greenhouse, PT [facility]  
11/13/2018 10:30 AM Ganga Greenhouse, PT [facility]  
11/15/2018 10:30 AM Ganga Greenhouse, PT [facility]  
11/19/2018 10:00 AM Ganga Greenhouse, PT [facility]  
11/21/2018 10:00 AM Ganga Greenhouse, PT [facility]  
11/26/2018 10:00 AM Ganga Greenhouse, PT [facility]  
11/28/2018 10:00 AM Pat Ferreira, PT [facility]

## 2018-10-29 ENCOUNTER — APPOINTMENT (OUTPATIENT)
Dept: PHYSICAL THERAPY | Age: 63
End: 2018-10-29
Payer: COMMERCIAL

## 2018-10-31 ENCOUNTER — HOSPITAL ENCOUNTER (OUTPATIENT)
Dept: PHYSICAL THERAPY | Age: 63
Discharge: HOME | End: 2018-10-31
Payer: COMMERCIAL

## 2018-10-31 PROCEDURE — 97140 MANUAL THERAPY 1/> REGIONS: CPT | Performed by: PHYSICAL THERAPIST

## 2018-10-31 PROCEDURE — 97110 THERAPEUTIC EXERCISES: CPT | Performed by: PHYSICAL THERAPIST

## 2018-10-31 NOTE — PROGRESS NOTES
.. PHYSICAL THERAPY - DAILY TREATMENT NOTE Patient Name: Riccardo Francois        Date: 10/31/2018 : 1955   yes Patient  Verified Visit #:   15   of   18  Insurance: Payor: OPTIMA / Plan: [place] OPTIMA PPO / Product Type: PPO / In time: 228 Out time: 320 Total Treatment Time: 50 Medicare/[facility] Time Tracking (below) Total Timed Codes (min):  na 1:1 Treatment Time:  na  
TREATMENT AREA =  Left shoulder pain [M25.512] SUBJECTIVE Pain Level (on 0 to 10 scale):  0  / 10 Medication Changes/New allergies or changes in medical history, any new surgeries or procedures?    no  If yes, update Summary List  
Subjective Functional Status/Changes:  []  No changes reported I saw the doctor the other day and he was really impressed with my shoulder. He said that my rom was more than he was expecting and he OBJECTIVE 35 min Therapeutic Exercise:  [x]  See flow sheet Rationale:      increase ROM, increase strength, improve coordination and increase proprioception to improve the patients ability to perform ADLs. 15 min Manual Therapy: PROM elevation, IR, ER, inferior ghj mobs, posterior mobs, subscapularis release Rationale:      decrease pain, increase ROM and increase tissue extensibility to improve patient's ability to perform ADLs. min Patient Education:  yes  Reviewed HEP []  Progressed/Changed HEP based on: Other Objective/Functional Measures: 
 
ttp along mid muscle belly of subscapularis at axilla Added in resisted ir with cues requiring for humeral head ocrrection Post Treatment Pain Level (on 0 to 10) scale:   0  / 10 ASSESSMENT Assessment/Changes in Function: Pt progressing very well with PT and is tolerating increasing resistance with exercises. Still avoiding resisted IR until next visit  
[]  See Progress Note/Recertification Patient will continue to benefit from skilled PT services to modify and progress therapeutic interventions, address functional mobility deficits, address ROM deficits, address strength deficits, analyze and address soft tissue restrictions and analyze and cue movement patterns to attain remaining goals. Progress toward goals / Updated goals: Will continue an additional month per md to increase pres PLAN 
[]  Upgrade activities as tolerated yes Continue plan of care  
[]  Discharge due to :   
[]  Other:   
 
Therapist: Lorenzo Rene PT, SPT Date: 10/31/2018 Time: 2:27 PM  
 
Future Appointments Date Time Provider Alex Bailey 11/2/2018  8:30 AM Magy Hoang, PT [facility]  
11/7/2018  9:30 AM Magy Hoang, PT [address] [place] [address]  
11/9/2018  9:00 AM Magy Hoang, PT [facility]  
11/13/2018 10:30 AM Magy Hoang, PT [facility]  
11/15/2018 10:30 AM Magy Hoang PT [facility]  
11/19/2018 10:00 AM Magy Hoang, PT [facility]  
11/21/2018 10:00 AM Magy Hoang, PT [facility]  
11/26/2018 10:00 AM Magy Hoang, PT [facility]  
11/28/2018 10:00 AM Brit Ferreira, PT [facility]

## 2018-11-02 ENCOUNTER — HOSPITAL ENCOUNTER (OUTPATIENT)
Dept: PHYSICAL THERAPY | Age: 63
Discharge: HOME | End: 2018-11-02
Payer: COMMERCIAL

## 2018-11-02 PROCEDURE — 97110 THERAPEUTIC EXERCISES: CPT | Performed by: PHYSICAL THERAPIST

## 2018-11-02 PROCEDURE — 97140 MANUAL THERAPY 1/> REGIONS: CPT | Performed by: PHYSICAL THERAPIST

## 2018-11-02 NOTE — PROGRESS NOTES
.. PHYSICAL THERAPY - DAILY TREATMENT NOTE Patient Name: Carson Fregoso        Date: 2018 : 1955   yes Patient  Verified Visit #:     Insurance: Payor: Larry Hannon / Plan: [place] OPTIMA PPO / Product Type: PPO / In time: 837 Out time: 535 Total Treatment Time: 62 Medicare/[facility] Time Tracking (below) Total Timed Codes (min):  na 1:1 Treatment Time:  na  
TREATMENT AREA =  Left shoulder pain [M25.512] SUBJECTIVE Pain Level (on 0 to 10 scale):  0  / 10 Medication Changes/New allergies or changes in medical history, any new surgeries or procedures?    no  If yes, update Summary List  
Subjective Functional Status/Changes:  []  No changes reported I feel good, I was moving light furniture in the house yesterday and I am not even sore after. It is also getting easier to sleep on my arm. OBJECTIVE 43 min Therapeutic Exercise:  [x]  See flow sheet Rationale:      increase ROM, increase strength and improve coordination to improve the patients ability to perform ADLs. 15 min Manual Therapy: PROM ER, IR, elevation, post and inf joint mobs, pec minor release Rationale:      decrease pain, increase ROM and increase tissue extensibility to improve patient's ability to perform ADLs. min Patient Education:  yes  Reviewed HEP []  Progressed/Changed HEP based on: Other Objective/Functional Measures: FIR: L3 
ttp on coracoid process and along pec minor muscle belly Post Treatment Pain Level (on 0 to 10) scale:   0  / 10 ASSESSMENT Assessment/Changes in Function:  
 
Pt is progressing well with increasing exercise intensity. Will progress with resisted IR per protocol. []  See Progress Note/Recertification Patient will continue to benefit from skilled PT services to modify and progress therapeutic interventions, address functional mobility deficits, address ROM deficits, address strength deficits, analyze and address soft tissue restrictions and analyze and cue movement patterns to attain remaining goals. Progress toward goals / Updated goals: 
Progress in order to return to PLOF FOT increased an additional 7 points since previous attempt PLAN 
[]  Upgrade activities as tolerated yes Continue plan of care  
[]  Discharge due to :   
[]  Other:   
 
Therapist: DE Garces Date: 11/2/2018 Time: 8:42 AM  
 
Future Appointments Date Time Provider Alex Bailey 11/7/2018  9:30 AM Nelsy Walker, PT [address] [place] [address]  
11/9/2018  9:00 AM Nelsy Walker, PT [facility]  
11/13/2018 10:30 AM Nelsy Walker, PT [facility]  
11/15/2018 10:30 AM Nelsy Walker, PT [facility]  
11/19/2018 10:00 AM Nelsy Walker, PT [facility]  
11/21/2018 10:00 AM Nelsy Walker, PT [facility]  
11/26/2018 10:00 AM Nelsy Walker, PT [facility]  
11/28/2018 10:00 AM Shun Ferreira, PT [facility]

## 2018-11-07 ENCOUNTER — HOSPITAL ENCOUNTER (OUTPATIENT)
Dept: PHYSICAL THERAPY | Age: 63
Discharge: HOME | End: 2018-11-07
Payer: COMMERCIAL

## 2018-11-07 PROCEDURE — 97110 THERAPEUTIC EXERCISES: CPT | Performed by: PHYSICAL THERAPIST

## 2018-11-07 PROCEDURE — 97140 MANUAL THERAPY 1/> REGIONS: CPT | Performed by: PHYSICAL THERAPIST

## 2018-11-07 NOTE — PROGRESS NOTES
.. PHYSICAL THERAPY - DAILY TREATMENT NOTE Patient Name: Katya Arias        Date: 2018 : 1955   yes Patient  Verified Visit #:     Insurance: Payor: OPTIMA / Plan: [place] OPTIMA PPO / Product Type: PPO / In time: 930 Out time: 1026 Total Treatment Time: 54 Medicare/[facility] Time Tracking (below) Total Timed Codes (min):  na 1:1 Treatment Time:  na  
TREATMENT AREA =  Left shoulder pain [M25.512] SUBJECTIVE Pain Level (on 0 to 10 scale):  0  / 10 Medication Changes/New allergies or changes in medical history, any new surgeries or procedures?    no  If yes, update Summary List  
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 15 min Manual Therapy: ghj prom all directions, dtm/release post cuff and pec, grade iii post mob Rationale:      decrease pain, increase ROM and increase tissue extensibility to improve patient's ability to perform ADLs. min Patient Education:  yes  Reviewed HEP []  Progressed/Changed HEP based on: Other Objective/Functional Measures: Added in multiple rtc te to address shoulder stability >/=90 degrees - fatigued with new te and noted 1/10 posterior lateral discomfort so held remaining te. After rest pt noted reduction in pain and left with 0/10 Post Treatment Pain Level (on 0 to 10) scale:   0  / 10 ASSESSMENT Assessment/Changes in Function:  
Continues to fatigue quickly and adapt ut/biceps compensation with lifting >90 degrees  
  
[]  See Progress Note/Recertification Patient will continue to benefit from skilled PT services to modify and progress therapeutic interventions, address functional mobility deficits, address ROM deficits, address strength deficits, analyze and address soft tissue restrictions and analyze and cue movement patterns to attain remaining goals. Progress toward goals / Updated goals: 
Continue to increase pres to address strength goals - making good progress towards all ltg at an expedited time frame PLAN 
[]  Upgrade activities as tolerated yes Continue plan of care  
[]  Discharge due to :   
[]  Other:   
 
Therapist: Tho Cox PT Date: 11/7/2018 Time: 8:42 AM  
 
Future Appointments Date Time Provider Alex Bailey 11/7/2018  9:30 AM Krysten Riddles, PT [facility]  
11/9/2018  9:00 AM Krysten Riddles, PT [facility]  
11/13/2018 10:30 AM Krysten Riddles, PT [facility]  
11/15/2018 10:30 AM Krysten Riddles, PT [facility]  
11/19/2018 10:00 AM Krysten Riddles, PT [facility]  
11/21/2018 10:00 AM Krysten Riddles, PT [facility]  
11/26/2018 10:00 AM Krysten Riddles, PT [facility]  
11/28/2018 10:00 AM Ted Ferreira, PT [facility]

## 2018-11-09 ENCOUNTER — HOSPITAL ENCOUNTER (OUTPATIENT)
Dept: PHYSICAL THERAPY | Age: 63
Discharge: HOME | End: 2018-11-09
Payer: COMMERCIAL

## 2018-11-09 PROCEDURE — 97110 THERAPEUTIC EXERCISES: CPT | Performed by: PHYSICAL THERAPIST

## 2018-11-09 PROCEDURE — 97140 MANUAL THERAPY 1/> REGIONS: CPT | Performed by: PHYSICAL THERAPIST

## 2018-11-09 NOTE — PROGRESS NOTES
.. PHYSICAL THERAPY - DAILY TREATMENT NOTE Patient Name: Grady Loza        Date: 2018 : 1955   yes Patient  Verified Visit #:     Insurance: Payor: OPTIMA / Plan: [place] P[place] / Product Type: PPO / In time: 905 Out time: 959 Total Treatment Time: 47 Medicare/[facility] Time Tracking (below) Total Timed Codes (min):  na 1:1 Treatment Time:  na  
TREATMENT AREA =  Left shoulder pain [M25.512] SUBJECTIVE Pain Level (on 0 to 10 scale):  0  / 10 Medication Changes/New allergies or changes in medical history, any new surgeries or procedures?    no  If yes, update Summary List  
Subjective Functional Status/Changes:  []  No changes reported I feel better than I did coming in to last visit. I was really busy that day and sore but it is better now. OBJECTIVE 39 min Therapeutic Exercise:  [x]  See flow sheet Rationale:      increase ROM, increase strength and improve coordination to improve the patients ability to perform ADLs. 15 min Manual Therapy: PROM all directions, STM post cuff Rationale:      decrease pain, increase ROM and increase tissue extensibility to improve patient's ability to perform ADLs. min Patient Education:  yes  Reviewed HEP []  Progressed/Changed HEP based on: Other Objective/Functional Measures: 
 
Improved elevation ROM with decreased UT compensation. F IR remains L5/sacrum, right UR F IR L1 Post Treatment Pain Level (on 0 to 10) scale:   0  / 10 ASSESSMENT Assessment/Changes in Function: Pt progressing well in therapy, will continue to promote ghj stabilization in order to return to PLOF. []  See Progress Note/Recertification Patient will continue to benefit from skilled PT services to modify and progress therapeutic interventions, address functional mobility deficits, address ROM deficits, address strength deficits, analyze and address soft tissue restrictions and analyze and cue movement patterns to attain remaining goals. Progress toward goals / Updated goals: 
Progress in order to return to [facility]. PLAN 
[]  Upgrade activities as tolerated yes Continue plan of care  
[]  Discharge due to :   
[]  Other:   
 
Therapist: Amanda Adan [facility] Date: 11/9/2018 Time: 8:51 AM  
 
Future Appointments Date Time Provider Alex Bialey 11/9/2018  9:00 AM Milana Li, PT [facility]  
11/13/2018 10:30 AM Milana Li, PT [facility]  
11/15/2018 10:30 AM Milana Li, PT [facility]  
11/19/2018 10:00 AM Milana Li, PT [facility]  
11/21/2018 10:00 AM Milana Li, PT [facility]  
11/26/2018 10:00 AM Milana Li, PT [facility]  
11/28/2018 10:00 AM Sindhu Ferreira, PT [facility]

## 2018-11-13 ENCOUNTER — HOSPITAL ENCOUNTER (OUTPATIENT)
Dept: PHYSICAL THERAPY | Age: 63
Discharge: HOME | End: 2018-11-13
Payer: COMMERCIAL

## 2018-11-13 PROCEDURE — 97140 MANUAL THERAPY 1/> REGIONS: CPT | Performed by: PHYSICAL THERAPIST

## 2018-11-13 PROCEDURE — 97110 THERAPEUTIC EXERCISES: CPT | Performed by: PHYSICAL THERAPIST

## 2018-11-13 NOTE — PROGRESS NOTES
.. PHYSICAL THERAPY - DAILY TREATMENT NOTE Patient Name: Corwin Nair        Date: 2018 : 1955   yes Patient  Verified Visit #:     Insurance: Payor: Anil Arrieta / Plan: [place] PPO / Product Type: PPO / In time: 1035 Out time: 1135 Total Treatment Time: 60 Medicare/[facility] Time Tracking (below) Total Timed Codes (min):  na 1:1 Treatment Time:  na  
TREATMENT AREA =  Left shoulder pain [M25.512] SUBJECTIVE Pain Level (on 0 to 10 scale):  0  / 10 Medication Changes/New allergies or changes in medical history, any new surgeries or procedures?    no  If yes, update Summary List  
Subjective Functional Status/Changes:  []  No changes reported I don't have any pain, just a tad sore. Still the hardest things to do are the arm behind my back and pushing myself up off the floor when I am sitting. OBJECTIVE 45 min Therapeutic Exercise:  [x]  See flow sheet Rationale:      increase ROM, increase strength and improve coordination to improve the patients ability to perform ADLs. 15 min Manual Therapy: PROM ER, IR, LHB stretch, pec minor release Rationale:      increase ROM and increase tissue extensibility to improve patient's ability to perform ADLs. min Patient Education:  yes  Reviewed HEP []  Progressed/Changed HEP based on: Other Objective/Functional Measures: 
 
P! At end range flexion and IR Cueing needed to decrease UT compensation with scaption. Post Treatment Pain Level (on 0 to 10) scale:   1  / 10 ASSESSMENT Assessment/Changes in Function: Pt progressing well with PT, consistently reporting low p! levels. Will incorporate quadruped weight bearing/stabilization activities into the POC in order to improve transfer ability from the floor. Discussed with pt transitioning to 1x a week sessions in order to prepare for future DC. 
  
[]  See Progress Note/Recertification Patient will continue to benefit from skilled PT services to modify and progress therapeutic interventions, address functional mobility deficits, address ROM deficits, address strength deficits, analyze and address soft tissue restrictions and analyze and cue movement patterns to attain remaining goals. Progress toward goals / Updated goals: 
Progress in order to return to [facility]. PLAN 
[]  Upgrade activities as tolerated yes Continue plan of care  
[]  Discharge due to :   
[]  Other:   
 
Therapist: DE Morel Date: 11/13/2018 Time: 11:35 AM  
 
Future Appointments Date Time Provider Alex Bailey 11/19/2018 10:00 AM Ana Andre, PT [address] [place] [address]  
11/21/2018 10:00 AM Ana Andre PT [address] [place] [address]  
11/26/2018 10:00 AM Ana Andre, PT [facility]  
11/28/2018 10:00 AM Mary Ferreira, PT [facility]

## 2018-11-15 ENCOUNTER — APPOINTMENT (OUTPATIENT)
Dept: PHYSICAL THERAPY | Age: 63
End: 2018-11-15
Payer: COMMERCIAL

## 2018-11-19 ENCOUNTER — HOSPITAL ENCOUNTER (OUTPATIENT)
Dept: PHYSICAL THERAPY | Age: 63
Discharge: HOME | End: 2018-11-19
Payer: COMMERCIAL

## 2018-11-19 PROCEDURE — 97140 MANUAL THERAPY 1/> REGIONS: CPT | Performed by: PHYSICAL THERAPIST

## 2018-11-19 PROCEDURE — 97110 THERAPEUTIC EXERCISES: CPT | Performed by: PHYSICAL THERAPIST

## 2018-11-19 NOTE — PROGRESS NOTES
.. PHYSICAL THERAPY - DAILY TREATMENT NOTE Patient Name: Margaret Smith        Date: 2018 : 1955   yes Patient  Verified Visit #:     Insurance: Payor: Channing Ocasio / Plan: [place] PPO / Product Type: PPO / In time: 958 Out time: 1050 Total Treatment Time: 46 Medicare/[facility] Time Tracking (below) Total Timed Codes (min):  na 1:1 Treatment Time:  na  
TREATMENT AREA =  Left shoulder pain [M25.512] SUBJECTIVE Pain Level (on 0 to 10 scale):  0  / 10 Medication Changes/New allergies or changes in medical history, any new surgeries or procedures?    no  If yes, update Summary List  
Subjective Functional Status/Changes:  []  No changes reported See dc OBJECTIVE 42 min Therapeutic Exercise:  [x]  See flow sheet Rationale:      increase ROM, increase strength and improve coordination to improve the patients ability to perform ADLs. 10 min Manual Therapy: Prom all directions, dtm post cuff and pecs Rationale:      increase ROM and increase tissue extensibility to improve patient's ability to perform ADLs. min Patient Education:  yes  Reviewed HEP []  Progressed/Changed HEP based on: Other Objective/Functional Measures: Modified te to perform with resisted bands for increased hep consistency - pt was given all bands and pictures of hep See pn  
  
Post Treatment Pain Level (on 0 to 10) scale:   0  / 10 ASSESSMENT Assessment/Changes in Function:  
See pn  
  
[]  See Progress Note/Recertification Patient will continue to benefit from skilled PT services to modify and progress therapeutic interventions, address functional mobility deficits, address ROM deficits, address strength deficits, analyze and address soft tissue restrictions and analyze and cue movement patterns to attain remaining goals.  
Progress toward goals / Updated goals: 
See pn   
 
PLAN 
[]  Upgrade activities as tolerated yes Continue plan of care  
[]  Discharge due to :   
 []  Other:   
 
Therapist: Mike Hutchison, PT, SPT Date: 11/19/2018 Time: 11:35 AM  
 
Future Appointments Date Time Provider Alex Bailey 11/21/2018 10:00 AM Doris Counter, PT [facility]  
11/26/2018 10:00 AM Doris Counter, PT [facility]  
11/28/2018 10:00 AM Katheryn Ferreira, PT [facility]

## 2018-11-19 NOTE — PROGRESS NOTES
.[facility] [address] [facility] THERAPY 
[address] [place], [place] [address],[facility], [address] - Phone: [phone]  Fax: [phone] PROGRESS NOTE Patient Name: Brian SanchezB: 1955 Treatment/Medical Diagnosis: Left shoulder pain [M25.512] Referral Source: Tracie Fernandez Date of Initial Visit: 8/10/18 Attended Visits: 20 Missed Visits: 1 SUMMARY OF TREATMENT Pt has been treated at this facility 1-3x/week for a total of 20 visits with treatment consisting of therapeutic exercises for shoulder rom/strengthing/taylor-scapular stability, manual therapy (prom/mobs/stm) and modalities prn. In addition pt was instructed on hep CURRENT STATUS Pt has made excellent gains with PT. 1/10 max pain with sleeping on L side and repetitive overhead use otherwise baseline 0/10. Arom: flex 158, scaption 149, er 70, IR t10 spinal level. At this time strength deficits (abd/ir weakest) chief limitation. Would like to continue with therapy 1x/week over next month to address this with anticipation of d/c at that time Goal/Measure of Progress Goal Met? 1. Pt will increase shoulder arom IR to >/=t12 to A with dressing Status at last Eval: l1 Current Status: t10 yes 2. Pt will increase AROM shoulder elevation >/=120 without UT involvement in order to A with adls Status at last Eval: 120 Current Status: 158 yes 3. Pt will increase FOTO by 12 points in order to show functional improvement Status at last Eval: 59/100 Current Status: 66/100 progressing New Goals to be achieved in __4__  weeks: 1. Achieve goal #1 2. Pt will increase shoulder strength >/=4/5 all directions to improve ability to lift 3. Pt will be able to lifting >/=2# overhead in order to return to prior level of function of house cleaning RECOMMENDATIONS Continue 1x/4 weeks with emphasis on PREs and strengthening If you have any questions/comments please contact us directly at [phone]. Thank you for allowing us to assist in the care of your patient. Therapist Signature: Cherry Osorio DPT, MTC  Date: 11/19/2018 Time: 10:52 AM  
NOTE TO PHYSICIAN:  PLEASE COMPLETE THE ORDERS BELOW AND FAX TO [facility] Physical Therapy: [phone] If you are unable to process this request in 24 hours please contact our office: [phone] 
 
___ I have read the above report and request that my patient continue as recommended.  
___ I have read the above report and request that my patient continue therapy with the following changes/special instructions:_________________________________________________________  
___ I have read the above report and request that my patient be discharged from therapy.   
 
Physician Signature:       Date:      Time:

## 2018-11-21 ENCOUNTER — APPOINTMENT (OUTPATIENT)
Dept: PHYSICAL THERAPY | Age: 63
End: 2018-11-21
Payer: COMMERCIAL

## 2018-11-26 ENCOUNTER — APPOINTMENT (OUTPATIENT)
Dept: PHYSICAL THERAPY | Age: 63
End: 2018-11-26
Payer: COMMERCIAL

## 2018-11-28 ENCOUNTER — HOSPITAL ENCOUNTER (OUTPATIENT)
Dept: PHYSICAL THERAPY | Age: 63
Discharge: HOME | End: 2018-11-28
Payer: COMMERCIAL

## 2018-11-28 PROCEDURE — 97110 THERAPEUTIC EXERCISES: CPT | Performed by: PHYSICAL THERAPIST

## 2018-11-28 PROCEDURE — 97140 MANUAL THERAPY 1/> REGIONS: CPT | Performed by: PHYSICAL THERAPIST

## 2018-11-28 NOTE — PROGRESS NOTES
.. PHYSICAL THERAPY - DAILY TREATMENT NOTE Patient Name: Belgica Ghotra        Date: 2018 : 1955   yes Patient  Verified Visit #:     Insurance: Payor: OPTIMA / Plan: [place] OPTIMA PPO / Product Type: PPO / In time: 1000 Out time: 1057 Total Treatment Time: 62 Medicare/[employer] Time Tracking (below) Total Timed Codes (min):  na 1:1 Treatment Time:  na  
TREATMENT AREA =  Left shoulder pain [M25.512] SUBJECTIVE Pain Level (on 0 to 10 scale):  0  / 10 Medication Changes/New allergies or changes in medical history, any new surgeries or procedures?    no  If yes, update Summary List  
Subjective Functional Status/Changes:  []  No changes reported I felt more sore than normal after doing a lot on Thanksgiving, but it has gotten better. Across the body motions is where I feel the pull to most. 
 
  
 
OBJECTIVE 42 min Therapeutic Exercise:  [x]  See flow sheet Rationale:      increase ROM, increase strength and improve coordination to improve the patients ability to perform ADLs. 15 min Manual Therapy: PROM ER/IR, STM post cuff Rationale:      increase ROM and increase tissue extensibility to improve patient's ability to perform ADLs. min Patient Education:  yes  Reviewed HEP []  Progressed/Changed HEP based on: Other Objective/Functional Measures: 
 
ttp along deltoid insertion FIR: T10 
LTG #3: Met, decreased UT compensation FOTO:  65/100GROC: +7 Post Treatment Pain Level (on 0 to 10) scale:   0  / 10 ASSESSMENT Assessment/Changes in Function:  
 
Discussed with pt regarding current functional status, pt is pleased with progress thus far and is agreeable to DC pending status following upcoming trip. []  See Progress Note/Recertification Patient will continue to benefit from skilled PT services to modify and progress therapeutic interventions, address functional mobility deficits, address ROM deficits, address strength deficits, analyze and address soft tissue restrictions and analyze and cue movement patterns to attain remaining goals. Progress toward goals / Updated goals: 
Progressing well towards functional goals. advsied pt to continue consistent compliance [facility] hep PLAN 
[]  Upgrade activities as tolerated yes Continue plan of care  
[]  Discharge due to :   
[]  Other:   
 
Therapist: Nikita Rice Date: 11/28/2018 Time: 9:54 AM  
 
Future Appointments Date Time Provider Alex Bailey 11/28/2018 10:00 AM [facility]

## 2018-12-17 ENCOUNTER — APPOINTMENT (OUTPATIENT)
Dept: PHYSICAL THERAPY | Age: 63
End: 2018-12-17

## 2018-12-21 ENCOUNTER — APPOINTMENT (OUTPATIENT)
Dept: PHYSICAL THERAPY | Age: 63
End: 2018-12-21

## 2019-01-14 NOTE — PROGRESS NOTES
.[facility] [address] [facility] THERAPY 
[address],[facility], [address] - Phone: [phone]  Fax: [phone] DISCHARGE NOTE Patient Name: Belgica Ghotra : 1955 Treatment/Medical Diagnosis: Left shoulder pain [M25.512] Referral Source: Conrad Suarez Date of Initial Visit: 8/10/18 Attended Visits: 21 Missed Visits: 1 SUMMARY OF TREATMENT Pt has been treated at this facility 1-3x/week for a total of 20 visits with treatment consisting of therapeutic exercises for shoulder rom/strengthing/taylor-scapular stability, manual therapy (prom/mobs/stm) and modalities prn. In addition pt was instructed on hep CURRENT STATUS Pt has made excellent gains with PT. 1/10 max pain with sleeping on L side and repetitive overhead use otherwise baseline 0/10. Arom: flex 158, scaption 149, er 70, IR t10 spinal level. Pt called to cancel remaining last session stating he was able to perform all TE I.  
 
Goal/Measure of Progress Goal Met? 1. Pt will increase shoulder strength >/=4/5 all directions to improve ability to lift Status at last Eval:  Current Status:  yes 2. Pt will be able to lifting >/=2# overhead in order to return to prior level of function of house cleaning Status at last Eval: unable Current Status:  yes 3. Pt will increase FOTO by 12 points in order to show functional improvement Status at last Eval: 59/100 Current Status: 66/100 no RECOMMENDATIONS Pt self discharged. If you have any questions/comments please contact us directly at [phone]. Thank you for allowing us to assist in the care of your patient. Therapist Signature: Robel Rojas DPT, [facility]  Date: 2019 Time: 10:52 AM  
NOTE TO PHYSICIAN:  PLEASE COMPLETE THE ORDERS BELOW AND FAX TO [facility] Physical Therapy: [phone] If you are unable to process this request in 24 hours please contact our office: [phone] ___ I have read the above report and request that my patient continue as recommended.  
___ I have read the above report and request that my patient continue therapy with the following changes/special instructions:_________________________________________________________  
___ I have read the above report and request that my patient be discharged from therapy.   
 
Physician Signature:       Date:      Time:

## 2019-10-02 ENCOUNTER — HOSPITAL ENCOUNTER (OUTPATIENT)
Dept: PHYSICAL THERAPY | Age: 64
Discharge: HOME | End: 2019-10-02
Payer: COMMERCIAL

## 2019-10-02 PROCEDURE — 97161 PT EVAL LOW COMPLEX 20 MIN: CPT

## 2019-10-02 PROCEDURE — 97530 THERAPEUTIC ACTIVITIES: CPT

## 2019-10-02 PROCEDURE — 97110 THERAPEUTIC EXERCISES: CPT

## 2019-10-02 NOTE — PROGRESS NOTES
[address] PHYSICAL THERAPY   [facility] [address] [place] [address],[facility], [address] [facility] - Phone: [phone]  Fax: [phone] OF CARE / [address] [facility]  Patient Name: Mandy Ramos : 1955   Medical   Diagnosis: Left knee pain [M25.562] Treatment Diagnosis: Left knee pain [M25.562]   Onset Date: 19     Referral Source: Servando Tobar [facility]): 10/2/2019   Prior Hospitalization: See medical history Provider #: [id number]   Prior Level of Function: No pain ambulating, squatting, standing, or performing car transfers   Comorbidities: DM, heart disease, 3 cardiac stents, OA, bilateral inguinal hernia sx 10 years ago, bilateral plantar fasciitis, RTC tear RUE   Medications: Verified on Patient Summary List   The Plan of Care and following information is based on the information from the initial evaluation.   ========================================================================  Assessment / key information: Patient is a 61 y.o. male who presents to [facility] with a diagnosis of Left knee pain [M25.562]. Patient is his own historian. Living situation is as follows: with wife in [address] [place] [address] with 2 KATHERINE. Bedroom is on first floor. 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 Pt presents with c/o left knee pain, weakness, swelling, decreased mobility and decreased ability to ambulate since his arthroscopic surgery on 19. Pt is WBAT LLE and aware of his post op therex and protocol. Pt is icing the left knee regularly. No c/o LE buckling/LOB/falls. Pt is leaving for [place] next week and has a f/u with surgeon on 10/7/19. He dons bilateral heel inserts when ambulating secondary to plantar fasciits and does not use an AD. BLE myotomes and dermatomes intact.    Current Deficits include: pain, edema, decreased mobility, decreased strength, and decreased postural awareness with resulting limitations in ADL's and in functional abilities. Impairments are as follows:   Pain 2-8/10 VAS in the left knee; +TTP distal left rectus femoris  Posture BLE genu varum and mild pes planus  Observations incision left knee clean, dry, intact without dehiscence or drainage  Gait antalgic with decreased LLE WBing in midstance and decreased push off, knee flexion in swing, and hip extension  AROM/PROM   Active Movements: in supine  Left knee 5-90/3-100  Right knee 0-130/0-135  Strength   RLE 5/5 throughout  LLE knee flexion 3+ and knee extension 4- within limited ROM  Left PF 3/5  Special Tests  Circumferential measures:  Superior patellar poles L 45.5cm R 42.5cm  Inferior pole L 38.5cm R 37cm  Mid gastrocs L 35cm R 33cm  REC 30sec on stable surface  +90-90 BLE  No extension lag with LLE SLR  Functional Tests   SLS LLE 18sec RLE 18sec  Functional Deficits include: see above. Patient's FOTO score was a 55/100 indicating decreased function.  Patient will benefit from a POC addressing such impairments and limitations in order to improve quality of life and return to PLOF.    ========================================================================  Eval Complexity: History: HIGH Complexity :3+ comorbidities / personal factors will impact the outcome/ POC Exam:MEDIUM Complexity : 3 Standardized tests and measures addressing body structure, function, activity limitation and / or participation in recreation  Presentation: LOW Complexity : Stable, uncomplicated  Clinical Decision Making:MEDIUM Complexity : FOTO score of 26-74Overall Complexity:LOW   Problem List: pain affecting function, decrease ROM, decrease strength, edema affecting function, impaired gait/ balance, decrease ADL/ functional abilitiies, decrease activity tolerance, decrease flexibility/ joint mobility and decrease transfer abilities   Treatment Plan may include any combination of the following: Therapeutic exercise, Therapeutic activities, Neuromuscular re-education, Physical agent/modality, Gait/balance training, Manual therapy, Patient education, Self Care training, Functional mobility training, Home safety training and Stair training  Patient / Family readiness to learn indicated by: asking questions  Persons(s) to be included in education: patient (P)  Barriers to Learning/Limitations: None  Measures taken: A HEP was initiated to assist with POC in restoring function; ice   Patient Goal (s): \"get the knee stronger\"   Patient self reported health status: good  Rehabilitation Potential: excellent   Short Term Goals: To be accomplished in  2  treatments:  1. Pt will be compliant with HEP for symptom management at home.  Long Term Goals: To be accomplished in  8-12  treatments:  1. Pt will demonstrate an increased FOTO score to 65/100 in order to improve function  2. Pt will be independent with HEP at D/C for self management. 3. Pt will demonstrate decreased edema by 2cm at the LLE superior, inferior patella poles in order to decrease pain and allow for improved functional ROM  4. Pt will demonstrate increased AROM LLE to 0-130* in order to improve ability to ambulate and negotiate stairs     Frequency / Duration:     Patient to be seen  2  times per week for 8-12  treatments:  Patient / Caregiver education and instruction: exercises    Therapist Signature: No Spencer PT Date: 41/6/0920   Certification Period:  Time: 8:57 AM   ========================================================================  I certify that the above Physical Therapy Services are being furnished while the patient is under my care. I agree with the treatment plan and certify that this therapy is necessary. Physician Signature:        Date:       Time:   Please sign and return to [facility]. or you may fax the signed copy to [phone]. Thank you.

## 2019-10-02 NOTE — PROGRESS NOTES
PT KNEE EVAL AND TREATMENT    Patient Name: Jam Ortiz  Date:10/2/2019  : 1955  [x]  Patient  Verified  Payor: Jose Gerber / [place]: [place] / Product Type: PPO /    In time:900  Out time:1010  Total Treatment Time (min): 70  Total Timed Codes (min):   1:1 Treatment Time ( [address] only):    Visit #: 1     Treatment Area: Left knee pain [M25.562]    SUBJECTIVE  Pain Level (0-10 on visual analog scale): 2  Any medication changes, allergies to medications, diagnosis change, or new procedure performed: see summary sheet for update  Subjective functional status/changes:  Assessment / key information: Patient is a 61 y.o. male who presents to [facility] with a diagnosis of Left knee pain [M25.562]. Patient is his own historian. Living situation is as follows: with wife in [address] [place] [address] with 2 KATHERINE. Bedroom is on first floor. 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 Pt presents with c/o left knee pain, weakness, swelling, decreased mobility and decreased ability to ambulate since his arthroscopic surgery on 19. Pt is WBAT LLE and aware of his post op therex and protocol. Pt is icing the left knee regularly. No c/o LE buckling/LOB/falls. Pt is leaving for [place] next week and has a f/u with surgeon on 10/7/19. He dons bilateral heel inserts when ambulating secondary to plantar fasciits and does not use an AD. BLE myotomes and dermatomes intact. Current Deficits include: pain, edema, decreased mobility, decreased strength, and decreased postural awareness with resulting limitations in ADL's and in functional abilities.    Impairments are as follows:   Pain 2-8/10 VAS in the left knee; +TTP distal left rectus femoris  Posture BLE genu varum and mild pes planus  Observations incision left knee clean, dry, intact without dehiscence or drainage  Gait antalgic with decreased LLE WBing in midstance and decreased push off, knee flexion in swing, and hip extension  AROM/PROM   Active Movements: in supine  Left knee 5-90/3-100  Right knee 0-130/0-135  Strength   RLE 5/5 throughout  LLE knee flexion 3+ and knee extension 4- within limited ROM  Left PF 3/5  Special Tests  Circumferential measures:  Superior patellar poles L 45.5cm R 42.5cm  Inferior pole L 38.5cm R 37cm  Mid gastrocs L 35cm R 33cm  REC 30sec on stable surface  +90-90 BLE  No extension lag with LLE SLR  Functional Tests   SLS LLE 18sec RLE 18sec      TREATMENT  Modalities Rationale:     decrease edema, decrease inflammation and decrease pain to improve patient's ability to ambulate after eval   min [] Estim, type/location:                                      []  att     []  unatt     []  w/US     []  w/ice    []  w/heat    min []  Mechanical Traction: type/lbs                   []  pro   []  sup   []  int   []  cont    []  before manual    []  after manual    min []  Ultrasound, settings/location:      min []  Iontophoresis w/ dexamethasone, location:                                               []  take home patch       []  in clinic   10 min [x]  Ice     []  Heat    location/position: Left knee while elevated    min []  Vasopneumatic Device, press/temp:     min []  Other:    [x] Skin assessment post-treatment (if applicable):    [x]  intact    []  redness- no adverse reaction     []redness  adverse reaction:        10 min Therapeutic Exercise:  [x]  See flow sheet   Rationale:      increase ROM and increase strength to improve the patients ability to ambulate with decreased pain       8 min Therapeutic Activity: [x]  See flow sheet; instruction on icing and elevation and avoidance of pivoting/twisting/prolonged WBing; signs and symptoms of infection   Rationale:    increase ROM and increase strength to improve the patients ability to get in/out of chairs/car more easily    Billed With/As:   [x] TE   [x] TA   [] Neuro   [] Self Care Patient Education: [x] Review HEP    [] Progressed/Changed HEP based on:   [x] positioning [x] body mechanics   [x] transfers   [x] heat/ice application    [] other:      Pain Level (0-10 scale) post treatment: 3    ASSESSMENT  [x]  See Plan of Care    PLAN  [x]  Upgrade activities as tolerated     [x] Other:_    See POC for Frequency/duration of visits       Lauri Juárez PT 10/2/2019  11:31 AM

## 2019-10-04 ENCOUNTER — HOSPITAL ENCOUNTER (OUTPATIENT)
Dept: PHYSICAL THERAPY | Age: 64
Discharge: HOME | End: 2019-10-04
Payer: COMMERCIAL

## 2019-10-04 PROCEDURE — 97140 MANUAL THERAPY 1/> REGIONS: CPT | Performed by: PHYSICAL THERAPIST

## 2019-10-04 PROCEDURE — 97110 THERAPEUTIC EXERCISES: CPT | Performed by: PHYSICAL THERAPIST

## 2019-10-04 NOTE — PROGRESS NOTES
Carolann Ratliff   PHYSICAL THERAPY - DAILY TREATMENT NOTE    Patient Name: Sherri Dunn        Date: 10/4/2019  : 1955   yes Patient  Verified  Visit #:   2     Insurance: Payor: Wale Vasquez / Plan: [place] PPO / Product Type: PPO /      In time: 940 Out time: 9671   Total Treatment Time: 55     Medicare/[employer] Time Tracking (below)   Total Timed Codes (min):  na 1:1 Treatment Time:  na     TREATMENT AREA =  Left knee pain [M25.562]    SUBJECTIVE  Pain Level (on 0 to 10 scale):  1 / 10   Medication Changes/New allergies or changes in medical history, any new surgeries or procedures?    no  If yes, update Summary List   Subjective Functional Status/Changes:  []  No changes reported     Just feels tight and stiff on the top part of my knee when I try and bend it          OBJECTIVE  Modalities Rationale:     decrease inflammation, decrease pain and increase tissue extensibility to improve patient's ability to complete adls    min [] Estim, type/location:                                      []  att     []  unatt     []  w/US     []  w/ice    []  w/heat    min []  Mechanical Traction: type/lbs                   []  pro   []  sup   []  int   []  cont    []  before manual    []  after manual    min []  Ultrasound, settings/location:      min []  Iontophoresis w/ dexamethasone, location:                                               []  take home patch       []  in clinic   10 min [x]  Ice     []  Heat    location/position: Supine with bolster     min []  Vasopneumatic Device, press/temp:     min []  Other:    [] Skin assessment post-treatment (if applicable):    []  intact    []  redness- no adverse reaction     []redness  adverse reaction:        30 min Therapeutic Exercise:  [x]  See flow sheet   Rationale:      increase ROM, increase strength, improve coordination, improve balance and increase proprioception to improve the patients ability to complete adls     15 min Manual Therapy: [facility] mobs all directions, stm anterior knee/quad, hs and gastroc stretch   Rationale:      decrease pain, increase ROM, increase tissue extensibility and decrease trigger points to improve patient's ability to complete adls      Billed With/As:   [] TE   [] TA   [] Neuro   [] Self Care Patient Education: [x] Review HEP    [] Progressed/Changed HEP based on:   [] positioning   [] body mechanics   [] transfers   [] heat/ice application    [] other:      Other Objective/Functional Measures:    Initiated session with te followed by mt and ice  Increased time required to achieve >90 degrees but denied pain with end range     Post Treatment Pain Level (on 0 to 10) scale:   2  / 10     ASSESSMENT  Assessment/Changes in Function:     Reported slight increase in pain following session but noted that as mm soreness and demo verbal understanding of how to appropriately manage     []  See Progress Note/Recertification   Patient will continue to benefit from skilled PT services to modify and progress therapeutic interventions, address functional mobility deficits, address ROM deficits, address strength deficits, analyze and address soft tissue restrictions, analyze and cue movement patterns, analyze and modify body mechanics/ergonomics, assess and modify postural abnormalities, address imbalance/dizziness and instruct in home and community integration to attain remaining goals.    Progress toward goals / Updated goals:    Compliant with hep     PLAN  []  Upgrade activities as tolerated yes Continue plan of care   []  Discharge due to :    []  Other:      Therapist: Kelsey Griffin PT    Date: 10/4/2019 Time: 11:43 AM     Future Appointments   Date Time Provider Alex Bailey   10/16/2019  8:30 AM Paulie Downey PT [facility]   10/18/2019  9:00 AM Paulie Downey PT [facility]   10/23/2019  8:30 AM Paulie Downey PT [facility]   10/25/2019  9:00 AM Paulie Downey PT [facility]   10/30/2019  8:30 AM Paulie Downey PT [facility]   11/1/2019  8:30 AM Jhon, Norah Rothman, PT [facility]

## 2019-10-16 ENCOUNTER — APPOINTMENT (OUTPATIENT)
Dept: PHYSICAL THERAPY | Age: 64
End: 2019-10-16
Payer: COMMERCIAL

## 2019-10-18 ENCOUNTER — HOSPITAL ENCOUNTER (OUTPATIENT)
Dept: PHYSICAL THERAPY | Age: 64
Discharge: HOME | End: 2019-10-18
Payer: COMMERCIAL

## 2019-10-18 PROCEDURE — 97140 MANUAL THERAPY 1/> REGIONS: CPT | Performed by: PHYSICAL THERAPIST

## 2019-10-18 PROCEDURE — 97110 THERAPEUTIC EXERCISES: CPT | Performed by: PHYSICAL THERAPIST

## 2019-10-18 NOTE — PROGRESS NOTES
Ezequiel Levin   PHYSICAL THERAPY - DAILY TREATMENT NOTE    Patient Name: Guillermo Van        Date: 10/18/2019  : 1955   yes Patient  Verified  Visit #:   3     Insurance: Payor: Ally Buckner / Plan: [place] [employer] / Product Type: PPO /      In time: 858 Out time: 940   Total Treatment Time: 42     Medicare/[facility] Time Tracking (below)   Total Timed Codes (min):  na 1:1 Treatment Time:  na     TREATMENT AREA =  Left knee pain [M25.562]    SUBJECTIVE  Pain Level (on 0 to 10 scale):  1 / 10   Medication Changes/New allergies or changes in medical history, any new surgeries or procedures?    no  If yes, update Summary List   Subjective Functional Status/Changes:  []  No changes reported     It did swell for a little bit for the first week I was up and on it        OBJECTIVE      30 min Therapeutic Exercise:  [x]  See flow sheet   Rationale:      increase ROM, increase strength, improve coordination, improve balance and increase proprioception to improve the patients ability to complete adls     12 min Manual Therapy: Pat mobs all directions, stm anterior knee/quad, hs and gastroc stretch   Rationale:      decrease pain, increase ROM, increase tissue extensibility and decrease trigger points to improve patient's ability to complete adls      Billed With/As:   [] TE   [] TA   [] Neuro   [] Self Care Patient Education: [x] Review HEP    [] Progressed/Changed HEP based on:   [] positioning   [] body mechanics   [] transfers   [] heat/ice application    [] other:      Other Objective/Functional Measures:  Knee prom flexion 119 without pain, ttp along portal sites   Progressed wb te as per flow sheet with only min cues required to perform squats with appropriate hip hinge     Post Treatment Pain Level (on 0 to 10) scale:   0  / 10     ASSESSMENT  Assessment/Changes in Function:   No increase in pain following session        []  See Progress Note/Recertification   Patient will continue to benefit from skilled PT services to modify and progress therapeutic interventions, address functional mobility deficits, address ROM deficits, address strength deficits, analyze and address soft tissue restrictions, analyze and cue movement patterns, analyze and modify body mechanics/ergonomics, assess and modify postural abnormalities, address imbalance/dizziness and instruct in home and community integration to attain remaining goals.    Progress toward goals / Updated goals:    rom goal achieved this session will assess carry over into next session      PLAN  []  Upgrade activities as tolerated yes Continue plan of care   []  Discharge due to :    []  Other:      Therapist: Jenae Jefferson PT    Date: 10/18/2019 Time: 11:43 AM     Future Appointments   Date Time Provider Alex Bailey   10/18/2019  9:00 AM Brandyn Teresa PT [facility]   10/23/2019  8:30 AM Brandyn Teresa PT [facility]   10/25/2019  9:00 AM Brandyn Teresa PT [facility]   10/30/2019  8:30 AM Brandyn Teresa PT [facility]   11/1/2019  8:30 AM Noble Ferreira, PT [facility]

## 2019-10-23 ENCOUNTER — HOSPITAL ENCOUNTER (OUTPATIENT)
Dept: PHYSICAL THERAPY | Age: 64
Discharge: HOME | End: 2019-10-23
Payer: COMMERCIAL

## 2019-10-23 PROCEDURE — 97140 MANUAL THERAPY 1/> REGIONS: CPT | Performed by: PHYSICAL THERAPIST

## 2019-10-23 PROCEDURE — 97110 THERAPEUTIC EXERCISES: CPT | Performed by: PHYSICAL THERAPIST

## 2019-10-23 NOTE — PROGRESS NOTES
Carolann Ratliff   PHYSICAL THERAPY - DAILY TREATMENT NOTE    Patient Name: Sherri Dunn        Date: 10/23/2019  : 1955   yes Patient  Verified  Visit #:   4     Insurance: Payor: Wale Vasquez / Plan: Rubina Yancey PPO / Product Type: PPO /      In time: 098 Out time: 951   Total Treatment Time: 43     Medicare/[facility] Time Tracking (below)   Total Timed Codes (min):  na 1:1 Treatment Time:  na     TREATMENT AREA =  Left knee pain [M25.562]    SUBJECTIVE  Pain Level (on 0 to 10 scale):  0 / 10   Medication Changes/New allergies or changes in medical history, any new surgeries or procedures?    no  If yes, update Summary List   Subjective Functional Status/Changes:  []  No changes reported     Its like I made a turn for the better - I have had very little pain since last visit and I have not been babying it iether      OBJECTIVE      30 min Therapeutic Exercise:  [x]  See flow sheet   Rationale:      increase ROM, increase strength, improve coordination, improve balance and increase proprioception to improve the patients ability to complete adls     13 min Manual Therapy: Pat mobs all directions, stm anterior knee/quad, hs and gastroc stretch   Rationale:      decrease pain, increase ROM, increase tissue extensibility and decrease trigger points to improve patient's ability to complete adls      Billed With/As:   [] TE   [] TA   [] Neuro   [] Self Care Patient Education: [x] Review HEP    [] Progressed/Changed HEP based on:   [] positioning   [] body mechanics   [] transfers   [] heat/ice application    [] other:      Other Objective/Functional Measures:  Good form with squats with appropriate hip hinge and knee flexion to 65 degrees pain free  Denied pain with palpation to portal sites which were previously painful      Post Treatment Pain Level (on 0 to 10) scale:   0  / 10     ASSESSMENT  Assessment/Changes in Function:   No increase in pain following session        []  See Progress Note/Recertification   Patient will continue to benefit from skilled PT services to modify and progress therapeutic interventions, address functional mobility deficits, address ROM deficits, address strength deficits, analyze and address soft tissue restrictions, analyze and cue movement patterns, analyze and modify body mechanics/ergonomics, assess and modify postural abnormalities, address imbalance/dizziness and instruct in home and community integration to attain remaining goals.    Progress toward goals / Updated goals:    ltg #3 achieved      PLAN  []  Upgrade activities as tolerated yes Continue plan of care   []  Discharge due to :    []  Other:      Therapist: Dacia Brown PT    Date: 10/23/2019 Time: 11:43 AM     Future Appointments   Date Time Provider Alex Bailey   10/25/2019  9:00 AM Renetta Reilly, PT [facility]   10/30/2019  8:30 AM Renetta Reilly, PT [facility]   11/1/2019  8:30 AM Hartzog, Orene Closs, PT [facility]

## 2019-10-25 ENCOUNTER — HOSPITAL ENCOUNTER (OUTPATIENT)
Dept: PHYSICAL THERAPY | Age: 64
Discharge: HOME | End: 2019-10-25
Payer: COMMERCIAL

## 2019-10-25 PROCEDURE — 97140 MANUAL THERAPY 1/> REGIONS: CPT | Performed by: PHYSICAL THERAPIST

## 2019-10-25 PROCEDURE — 97110 THERAPEUTIC EXERCISES: CPT | Performed by: PHYSICAL THERAPIST

## 2019-10-25 NOTE — PROGRESS NOTES
Fahad Bustos   PHYSICAL THERAPY - DAILY TREATMENT NOTE    Patient Name: Angie Ruelas        Date: 10/25/2019  : 1955   yes Patient  Verified  Visit #:   5     Insurance: Payor: Maninder Medley / Plan: [place] [employer] P[place] / Product Type: PPO /      In time: 900 Out time: 940   Total Treatment Time: 40     Medicare/[facility] Time Tracking (below)   Total Timed Codes (min):  na 1:1 Treatment Time:  na     TREATMENT AREA =  Left knee pain [M25.562]    SUBJECTIVE  Pain Level (on 0 to 10 scale):  0 / 10   Medication Changes/New allergies or changes in medical history, any new surgeries or procedures?    no  If yes, update Summary List   Subjective Functional Status/Changes:  []  No changes reported     I have no problem going up and down stairs      OBJECTIVE      30 min Therapeutic Exercise:  [x]  See flow sheet   Rationale:      increase ROM, increase strength, improve coordination, improve balance and increase proprioception to improve the patients ability to complete adls     10 min Manual Therapy: Pat mobs all directions, stm anterior knee/quad, hs and gastroc stretch   Rationale:      decrease pain, increase ROM, increase tissue extensibility and decrease trigger points to improve patient's ability to complete adls      Billed With/As:   [] TE   [] TA   [] Neuro   [] Self Care Patient Education: [x] Review HEP    [] Progressed/Changed HEP based on:   [] positioning   [] body mechanics   [] transfers   [] heat/ice application    [] other:      Other Objective/Functional Measures:  arom 0-122 without pain  Performed 20 reps of step ups without any pain      Post Treatment Pain Level (on 0 to 10) scale:   0  / 10     ASSESSMENT  Assessment/Changes in Function:   No increase in pain following session        []  See Progress Note/Recertification   Patient will continue to benefit from skilled PT services to modify and progress therapeutic interventions, address functional mobility deficits, address ROM deficits, address strength deficits, analyze and address soft tissue restrictions, analyze and cue movement patterns, analyze and modify body mechanics/ergonomics, assess and modify postural abnormalities, address imbalance/dizziness and instruct in home and community integration to attain remaining goals. Progress toward goals / Updated goals:     Will perform FOTO next session in order to assess progress towards goal     PLAN  []  Upgrade activities as tolerated yes Continue plan of care   []  Discharge due to :    []  Other:      Therapist: Daniella Sierra PT    Date: 10/25/2019 Time: 11:43 AM     Future Appointments   Date Time Provider Alex Bailey   10/30/2019  8:30 AM Des Mckeon PT [facility]   11/1/2019  8:30 AM Des Mckeon PT [facility]   11/4/2019  9:30 AM Des Mckeon PT [facility]   11/6/2019  9:30 AM Des Mckeon PT [facility]   11/11/2019  9:30 AM Des Mckeon PT [facility]   11/13/2019  9:30 AM Tejal Ferreira, PT [facility]

## 2019-10-30 ENCOUNTER — APPOINTMENT (OUTPATIENT)
Dept: PHYSICAL THERAPY | Age: 64
End: 2019-10-30
Payer: COMMERCIAL

## 2019-11-01 ENCOUNTER — HOSPITAL ENCOUNTER (OUTPATIENT)
Dept: PHYSICAL THERAPY | Age: 64
Discharge: HOME | End: 2019-11-01
Payer: COMMERCIAL

## 2019-11-01 PROCEDURE — 97110 THERAPEUTIC EXERCISES: CPT | Performed by: PHYSICAL THERAPIST

## 2019-11-01 PROCEDURE — 97140 MANUAL THERAPY 1/> REGIONS: CPT | Performed by: PHYSICAL THERAPIST

## 2019-11-01 NOTE — PROGRESS NOTES
Jacquie Oden   PHYSICAL THERAPY - DAILY TREATMENT NOTE    Patient Name: Mandy Ramos        Date: 2019  : 1955   yes Patient  Verified  Visit #:   6     Insurance: Payor: Lyubov Mendoza / Plan: [place] [employer] PPO / Product Type: PPO /      In time: 840 Out time: 920   Total Treatment Time: 35     Medicare/[facility] Time Tracking (below)   Total Timed Codes (min):  na 1:1 Treatment Time:  na     TREATMENT AREA =  Left knee pain [M25.562]    SUBJECTIVE  Pain Level (on 0 to 10 scale):  3  10   Medication Changes/New allergies or changes in medical history, any new surgeries or procedures?    no  If yes, update Summary List   Subjective Functional Status/Changes:  []  No changes reported   I over did it with the stairs and lifting yesterday with all my halloween decorations because both knees are bothering me        OBJECTIVE      20 min Therapeutic Exercise:  [x]  See flow sheet   Rationale:      increase ROM, increase strength, improve coordination, improve balance and increase proprioception to improve the patients ability to complete adls     15 min Manual Therapy: Pat mobs all directions, stm anterior knee/quad, hs and gastroc stretch   Rationale:      decrease pain, increase ROM, increase tissue extensibility and decrease trigger points to improve patient's ability to complete adls      Billed With/As:   [] TE   [] TA   [] Neuro   [] Self Care Patient Education: [x] Review HEP    [] Progressed/Changed HEP based on:   [] positioning   [] body mechanics   [] transfers   [] heat/ice application    [] other:      Other Objective/Functional Measures:  See pn    Post Treatment Pain Level (on 0 to 10) scale:   0  / 10     ASSESSMENT  Assessment/Changes in Function:     See pn      []  See Progress Note/Recertification   Patient will continue to benefit from skilled PT services to modify and progress therapeutic interventions, address functional mobility deficits, address ROM deficits, address strength deficits, analyze and address soft tissue restrictions, analyze and cue movement patterns, analyze and modify body mechanics/ergonomics, assess and modify postural abnormalities, address imbalance/dizziness and instruct in home and community integration to attain remaining goals.    Progress toward goals / Updated goals:    See pn      PLAN  []  Upgrade activities as tolerated yes Continue plan of care   []  Discharge due to :    []  Other:      Therapist: Jacqueline Ramsay PT    Date: 11/1/2019 Time: 11:43 AM     Future Appointments   Date Time Provider Alex Bailey   11/1/2019  8:30 AM Rajan Nunez, PT [facility]   11/4/2019  9:30 AM Rajan Nunez, PT [facility]   11/6/2019  9:30 AM Rajan Nunez, PT [facility]   11/11/2019  9:30 AM Rajan Nunez, PT [facility]   11/13/2019  9:30 AM Kristyn Ferreira, PT [facility]

## 2019-11-01 NOTE — PROGRESS NOTES
.TIFF Smith  PHYSICAL THERAPY  [address] [place] [address] [facility] [address],[facility], [address] [facility] - Phone: [phone]  Fax: [phone]  PROGRESS NOTE  Patient Name: Jam Ortiz : 1955   Treatment/Medical Diagnosis: Left knee pain [M25.562]   Referral Source: João James*     Date of Initial Visit: 10/2/19 Attended Visits: 6 Missed Visits: 2     SUMMARY OF TREATMENT  Pt was seen for IE and 5 f/2u visits with treatment consisting of therapeutic exercises for knee rom/LE Strength, manual therapy (prom/mobs/stm) and modalities prn. In addition pt was instructed on hep  CURRENT STATUS  Pt has made excellent progress with PT thus far. Pt has restored full arom with max pain 2/10 max pain at joint line with stairs/sqaut based activities otherwise baseline 0/10. Eccentric loading of quad and glute med weakness contributing to these. Would like to continue with therapy for remaining schedule visits to address these deficits     Goal/Measure of Progress Goal Met? 1. Pt will demonstrate increase FOTO to 65/100 in order to improve function   Status at last Eval: 55/100 Current Status: 55/100 no   2. Pt will demonstrate AROM LLE 0-130 in order to improve ability to ambulate and negotiate    Status at last Eval: 3-100 Current Status: 0-133 yes   3. Pt will be I with advanced hep in order to prepare for d/c   Status at last Eval: na Current Status: Still requires cues for pres no     New Goals to be achieved in __2-3__  weeks:  1. Achieve goal #1  2. Pt will be able to perform squats>65 degrees without medial collapse/approrpiate hip hinge to improve sit<>stand transfers  3. Achieve goal #3  RECOMMENDATIONS  Continue therapy an additional 2x2-3 weeks with progression of strengthen. If you have any questions/comments please contact us directly at [phone]. Thank you for allowing us to assist in the care of your patient.     Therapist Signature: Casey Alcazar DPT, [facility]  Date: 11/1/2019     Time: 8:12 AM   NOTE TO PHYSICIAN:  PLEASE COMPLETE THE ORDERS BELOW AND FAX TO   [facility]: [phone]  If you are unable to process this request in 24 hours please contact our office: [phone]    ___ I have read the above report and request that my patient continue as recommended.   ___ I have read the above report and request that my patient continue therapy with the following changes/special instructions:_________________________________________________________   ___ I have read the above report and request that my patient be discharged from therapy.      Physician Signature:        Date:       Time:

## 2019-11-04 ENCOUNTER — HOSPITAL ENCOUNTER (OUTPATIENT)
Dept: PHYSICAL THERAPY | Age: 64
Discharge: HOME | End: 2019-11-04
Payer: COMMERCIAL

## 2019-11-04 PROCEDURE — 97110 THERAPEUTIC EXERCISES: CPT | Performed by: PHYSICAL THERAPIST

## 2019-11-04 PROCEDURE — 97140 MANUAL THERAPY 1/> REGIONS: CPT | Performed by: PHYSICAL THERAPIST

## 2019-11-04 NOTE — PROGRESS NOTES
[facility] PHYSICAL THERAPY - DAILY TREATMENT NOTE    Patient Name: Jesica Hartman        Date: 2019  : 1955   yes Patient  Verified  Visit #:    Insurance: Payor: Claudia Kraft / [place]: [place] / Product Type: PPO /      In time: 930 Out time: 1025   Total Treatment Time: 55     Medicare/[facility] Time Tracking (below)   Total Timed Codes (min):  na 1:1 Treatment Time:  na     TREATMENT AREA =  Left knee pain [M25.562]    SUBJECTIVE  Pain Level (on 0 to 10 scale):  0 / 10   Medication Changes/New allergies or changes in medical history, any new surgeries or procedures?    no  If yes, update Summary List   Subjective Functional Status/Changes:  []  No changes reported   I have worked out that increase in my knee since last visit.  No pain          OBJECTIVE      40 min Therapeutic Exercise:  [x]  See flow sheet   Rationale:      increase ROM, increase strength, improve coordination, improve balance and increase proprioception to improve the patients ability to complete adls     15 min Manual Therapy: Pat mobs all directions, stm anterior knee/quad, hs and gastroc stretch   Rationale:      decrease pain, increase ROM, increase tissue extensibility and decrease trigger points to improve patient's ability to complete adls      Billed With/As:   [] TE   [] TA   [] Neuro   [] Self Care Patient Education: [x] Review HEP    [] Progressed/Changed HEP based on:   [] positioning   [] body mechanics   [] transfers   [] heat/ice application    [] other:      Other Objective/Functional Measures:  Progressed te as per flow sheet to include squad based activities without medial collapse but fatigue with new exercises    Post Treatment Pain Level (on 0 to 10) scale:   0  / 10     ASSESSMENT  Assessment/Changes in Function:     No increase in pain with progression      []  See Progress Note/Recertification   Patient will continue to benefit from skilled PT services to modify and progress therapeutic interventions, address functional mobility deficits, address ROM deficits, address strength deficits, analyze and address soft tissue restrictions, analyze and cue movement patterns, analyze and modify body mechanics/ergonomics, assess and modify postural abnormalities, address imbalance/dizziness and instruct in home and community integration to attain remaining goals.    Progress toward goals / Updated goals:    Progress towards squat goals      PLAN  []  Upgrade activities as tolerated yes Continue plan of care   []  Discharge due to :    []  Other:      Therapist: Zain Alanis PT    Date: 11/4/2019 Time: 11:43 AM     Future Appointments   Date Time Provider Alex Bailey   11/6/2019  9:30 AM Stephanie James, PT [facility]   11/11/2019  9:30 AM Stephanie James, PT [facility]   11/13/2019  9:30 AM Luz Ferreira, PT [facility]

## 2019-11-06 ENCOUNTER — APPOINTMENT (OUTPATIENT)
Dept: PHYSICAL THERAPY | Age: 64
End: 2019-11-06
Payer: COMMERCIAL

## 2019-11-11 ENCOUNTER — HOSPITAL ENCOUNTER (OUTPATIENT)
Dept: PHYSICAL THERAPY | Age: 64
Discharge: HOME | End: 2019-11-11
Payer: COMMERCIAL

## 2019-11-11 PROCEDURE — 97110 THERAPEUTIC EXERCISES: CPT | Performed by: PHYSICAL THERAPIST

## 2019-11-11 PROCEDURE — 97140 MANUAL THERAPY 1/> REGIONS: CPT | Performed by: PHYSICAL THERAPIST

## 2019-11-11 NOTE — PROGRESS NOTES
[facility] PHYSICAL THERAPY - DAILY TREATMENT NOTE    Patient Name: Rayann Brittle        Date: 2019  : 1955   yes Patient  Verified  Visit #:  8    Insurance: Payor: Enrique Guerra / [place]: [place] / Product Type: PPO /      In time: 928 Out time: 1010   Total Treatment Time: 42     Medicare/[facility] Time Tracking (below)   Total Timed Codes (min):  na 1:1 Treatment Time:  na     TREATMENT AREA =  Left knee pain [M25.562]    SUBJECTIVE  Pain Level (on 0 to 10 scale):  0 / 10   Medication Changes/New allergies or changes in medical history, any new surgeries or procedures?    no  If yes, update Summary List   Subjective Functional Status/Changes:  []  No changes reported     I was on my feet for like 10 hours during the election and I was tired but no pain or swelling.  I cannot really kneel on it just yet without any pain        OBJECTIVE      30 min Therapeutic Exercise:  [x]  See flow sheet   Rationale:      increase ROM, increase strength, improve coordination, improve balance and increase proprioception to improve the patients ability to complete adls     12 min Manual Therapy: Pat mobs all directions, stm anterior knee/quad, hs and gastroc stretch   Rationale:      decrease pain, increase ROM, increase tissue extensibility and decrease trigger points to improve patient's ability to complete adls      Billed With/As:   [] TE   [] TA   [] Neuro   [] Self Care Patient Education: [x] Review HEP    [] Progressed/Changed HEP based on:   [] positioning   [] body mechanics   [] transfers   [] heat/ice application    [] other:      Other Objective/Functional Measures:  Good form with all te with only min cues required for sets reps    Post Treatment Pain Level (on 0 to 10) scale:   0  / 10     ASSESSMENT  Assessment/Changes in Function:     No increase in pain before, during and after session      []  See Progress Note/Recertification   Patient will continue to benefit from skilled PT services to modify and progress therapeutic interventions, address functional mobility deficits, address ROM deficits, address strength deficits, analyze and address soft tissue restrictions, analyze and cue movement patterns, analyze and modify body mechanics/ergonomics, assess and modify postural abnormalities, address imbalance/dizziness and instruct in home and community integration to attain remaining goals.    Progress toward goals / Updated goals:    Eccentric quad/glute strength progressing appropriately      PLAN  []  Upgrade activities as tolerated yes Continue plan of care   []  Discharge due to :    []  Other:      Therapist: Kelsey Griffin PT    Date: 11/11/2019 Time: 11:43 AM     Future Appointments   Date Time Provider Alex Bailey   11/13/2019  9:30 AM Paulie Downey, PT [facility]

## 2019-11-13 ENCOUNTER — HOSPITAL ENCOUNTER (OUTPATIENT)
Dept: PHYSICAL THERAPY | Age: 64
Discharge: HOME | End: 2019-11-13
Payer: COMMERCIAL

## 2019-11-13 PROCEDURE — 97110 THERAPEUTIC EXERCISES: CPT | Performed by: PHYSICAL THERAPIST

## 2019-11-13 PROCEDURE — 97140 MANUAL THERAPY 1/> REGIONS: CPT | Performed by: PHYSICAL THERAPIST

## 2019-11-13 NOTE — PROGRESS NOTES
[facility]   PHYSICAL THERAPY - DAILY TREATMENT NOTE    Patient Name: Kirstie Etienne        Date: 2019  : 1955   yes Patient  Verified  Visit #:    Insurance: Payor: Jimenez Abdi / Plan: [place] [employer] / Product Type: PPO /      In time: 930 Out time: 1018   Total Treatment Time: 48     Medicare/[facility] Time Tracking (below)   Total Timed Codes (min):  na 1:1 Treatment Time:  na     TREATMENT AREA =  Left knee pain [M25.562]    SUBJECTIVE  Pain Level (on 0 to 10 scale):  0 / 10   Medication Changes/New allergies or changes in medical history, any new surgeries or procedures?    no  If yes, update Summary List   Subjective Functional Status/Changes:  []  No changes reported     I saw dr Jennifer Garcia and Clair Sewell and they said the reason I cannot kneel is because I still have a lot of kneeling and scar tissue on the front part of my knee      OBJECTIVE      38 min Therapeutic Exercise:  [x]  See flow sheet   Rationale:      increase ROM, increase strength, improve coordination, improve balance and increase proprioception to improve the patients ability to complete adls     10 min Manual Therapy: Pat mobs all directions, stm anterior knee/quad, hs and gastroc stretch   Rationale:      decrease pain, increase ROM, increase tissue extensibility and decrease trigger points to improve patient's ability to complete adls      Billed With/As:   [] TE   [] TA   [] Neuro   [] Self Care Patient Education: [x] Review HEP    [] Progressed/Changed HEP based on:   [] positioning   [] body mechanics   [] transfers   [] heat/ice application    [] other:      Other Objective/Functional Measures:  ttp at infra patellar pouch otherwise no pain reported with mt  Squats >65 degrees without pain      Post Treatment Pain Level (on 0 to 10) scale:   0  / 10     ASSESSMENT  Assessment/Changes in Function:   Eccentric quad strength chief deficit at this time        []  See Progress Note/Recertification   Patient will continue to benefit from skilled PT services to modify and progress therapeutic interventions, address functional mobility deficits, address ROM deficits, address strength deficits, analyze and address soft tissue restrictions, analyze and cue movement patterns, analyze and modify body mechanics/ergonomics, assess and modify postural abnormalities, address imbalance/dizziness and instruct in home and community integration to attain remaining goals.    Progress toward goals / Updated goals:    Pt to continue an additional 2-3 weeks to address weakness (per md request)      PLAN  []  Upgrade activities as tolerated yes Continue plan of care   []  Discharge due to :    []  Other:      Therapist: Jenae Jefferson, PT    Date: 11/13/2019 Time: 11:43 AM     Future Appointments   Date Time Provider Alex Bailey   11/13/2019  9:30 AM Brandyn Teresa, PT [facility]

## 2019-11-26 ENCOUNTER — HOSPITAL ENCOUNTER (OUTPATIENT)
Dept: PHYSICAL THERAPY | Age: 64
Discharge: HOME | End: 2019-11-26
Payer: COMMERCIAL

## 2019-11-26 PROCEDURE — 97110 THERAPEUTIC EXERCISES: CPT | Performed by: PHYSICAL THERAPIST

## 2019-11-26 PROCEDURE — 97140 MANUAL THERAPY 1/> REGIONS: CPT | Performed by: PHYSICAL THERAPIST

## 2019-11-26 NOTE — PROGRESS NOTES
[facility]   PHYSICAL THERAPY - DAILY TREATMENT NOTE    Patient Name: Rios Arellano        Date: 2019  : 1955   yes Patient  Verified  Visit #:  10   Insurance: Payor: Gabriella Duggan / Plan: [place] [employer] PPO / Product Type: PPO /      In time: 934 Out time: 1010   Total Treatment Time: 35     Medicare/[employer] Time Tracking (below)   Total Timed Codes (min):  na 1:1 Treatment Time:  na     TREATMENT AREA =  Left knee pain [M25.562]    SUBJECTIVE  Pain Level (on 0 to 10 scale):  0 / 10   Medication Changes/New allergies or changes in medical history, any new surgeries or procedures?    no  If yes, update Summary List   Subjective Functional Status/Changes:  []  No changes reported     See dc     OBJECTIVE      25 min Therapeutic Exercise:  [x]  See flow sheet   Rationale:      increase ROM, increase strength, improve coordination, improve balance and increase proprioception to improve the patients ability to complete adls     10 min Manual Therapy: Pat mobs all directions, stm anterior knee/quad, hs and gastroc stretch   Rationale:      decrease pain, increase ROM, increase tissue extensibility and decrease trigger points to improve patient's ability to complete adls      Billed With/As:   [] TE   [] TA   [] Neuro   [] Self Care Patient Education: [x] Review HEP    [] Progressed/Changed HEP based on:   [] positioning   [] body mechanics   [] transfers   [] heat/ice application    [] other:      Other Objective/Functional Measures:     Post Treatment Pain Level (on 0 to 10) scale:   0  / 10     ASSESSMENT  Assessment/Changes in Function:   See dc       []  See Progress Note/Recertification   Patient will continue to benefit from skilled PT services charlie martinez   Progress toward goals / Updated goals:  See dc       PLAN  []  Upgrade activities as tolerated yes Continue plan of care   []  Discharge due to :    []  Other:      Therapist: Daniella Sierra PT    Date: 2019 Time: 11:43 AM     Future Appointments   Date Time Provider Alex Bailey   11/26/2019  9:30 AM Digna Ferreira, PT [facility]

## 2019-11-27 NOTE — PROGRESS NOTES
.[facility]  PHYSICAL THERAPY  [address] [facility] [address],[facility], [address] [employer] - Phone: [phone]  Fax: [phone]  DISCHARGE NOTE  Patient Name: Adela Malave : 1955   Treatment/Medical Diagnosis: Left knee pain [M25.562]   Referral Source: Giovanni Mast*     Date of Initial Visit: 10/2/19 Attended Visits: 10 Missed Visits: 2     SUMMARY OF TREATMENT  Pt was seen for IE and 9 F/U visits with treatment consisting of therapeutic exercises for knee rom/LE Strength, manual therapy (prom/mobs/stm) and modalities prn. In addition pt was instructed on hep  CURRENT STATUS  Pt has achieved all goals appropriate for dc    Goal/Measure of Progress Goal Met? 1. Pt will demonstrate increase FOTO to 65/100 in order to improve function   Status at last Eval: 55/100 Current Status: 99/100 YES   2.  Pt will be able to perform squats>65 degrees without medial collapse/approrpiate hip hinge to improve sit<>stand transfers   Status at last Eval: 45 Current Status:  yes   3. Pt will be I with advanced hep in order to prepare for d/c   Status at last Eval: na Current Status: = YES       RECOMMENDATIONS  D/c from PT with hep  If you have any questions/comments please contact us directly at [phone]. Thank you for allowing us to assist in the care of your patient.     Therapist Signature: Sandro Johnson DPT [facility]  Date: 2019     Time: 8:12 AM   NOTE TO PHYSICIAN:  PLEASE COMPLETE THE ORDERS BELOW AND FAX TO   [facility] Physical Therapy: [phone]  If you are unable to process this request in 24 hours please contact our office: [phone]    ___ I have read the above report and request that my patient continue as recommended.   ___ I have read the above report and request that my patient continue therapy with the following changes/special instructions:_________________________________________________________   ___ I have read the above report and request that my patient be discharged from therapy.      Physician Signature:        Date:       Time:

## 2023-06-16 ENCOUNTER — HOSPITAL ENCOUNTER (OUTPATIENT)
Facility: HOSPITAL | Age: 68
Setting detail: RECURRING SERIES
Discharge: HOME | End: 2023-06-19
Payer: MEDICARE

## 2023-06-16 PROCEDURE — 97140 MANUAL THERAPY 1/> REGIONS: CPT

## 2023-06-16 PROCEDURE — 97162 PT EVAL MOD COMPLEX 30 MIN: CPT

## 2023-06-19 ENCOUNTER — HOSPITAL ENCOUNTER (OUTPATIENT)
Facility: HOSPITAL | Age: 68
Setting detail: RECURRING SERIES
Discharge: HOME | End: 2023-06-22
Payer: MEDICARE

## 2023-06-19 PROCEDURE — 97110 THERAPEUTIC EXERCISES: CPT

## 2023-06-19 PROCEDURE — 97140 MANUAL THERAPY 1/> REGIONS: CPT

## 2023-06-19 NOTE — PROGRESS NOTES
PHYSICAL / OCCUPATIONAL THERAPY - DAILY TREATMENT NOTE (updated )    Patient Name: Tara Cushing    Date: 2023    : 1955  Insurance: Payor: MEDICARE / Plan: MEDICARE PART A AND B / Product Type: *No Product type* /      Patient  verified Yes     Visit #   Current / Total 2 12   Time   In / Out 220  PM   Pain   In / Out 1-2/10 0/10   Subjective Functional Status/Changes: Patient reports having pain yesterday after walking for 5 minutes. Felt a little bit of the pain this morning. Noticed pain when getting up from the clinic's chair. Pain primarily located in the front of the knee. VAS 5-6/10  Next MD follow up appt 23   Changes to:  Meds, Allergies, Med Hx, Sx Hx? If yes, update Summary List no       TREATMENT AREA =  Pain in right knee [M25.561]    OBJECTIVE    Modalities Rationale:     decrease pain to improve patient's ability to progress to PLOF and address remaining functional goals. min [] Estim Unattended, type/location:                                      []  w/ice    []  w/heat    min [] Estim Attended, type/location:                                     []  w/US     []  w/ice    []  w/heat    []  TENS insruct      min []  Mechanical Traction: type/lbs                   []  pro   []  sup   []  int   []  cont    []  before manual    []  after manual    min []  Ultrasound, settings/location:      min []  Iontophoresis w/ dexamethasone, location:                                               []  take home patch       []  in clinic   10 min  unbill [x]  Ice     []  Heat    location/position: To RIGHT knee in supine with LE on wedge post-session     min []  Paraffin,  details:     min []  Vasopneumatic Device, press/temp:     min []  Renny Dates / Dottie Roup:     If using vaso (only need to measure limb vaso being performed on)      pre-treatment girth :       post-treatment girth :       measured at (landmark location) :      min []  Other:    Skin assessment post-treatment (if

## 2023-06-23 ENCOUNTER — HOSPITAL ENCOUNTER (OUTPATIENT)
Facility: HOSPITAL | Age: 68
Setting detail: RECURRING SERIES
Discharge: HOME | End: 2023-06-26
Payer: MEDICARE

## 2023-06-23 PROCEDURE — 97110 THERAPEUTIC EXERCISES: CPT

## 2023-06-23 PROCEDURE — 97140 MANUAL THERAPY 1/> REGIONS: CPT

## 2023-06-27 ENCOUNTER — HOSPITAL ENCOUNTER (OUTPATIENT)
Facility: HOSPITAL | Age: 68
Setting detail: RECURRING SERIES
Discharge: HOME | End: 2023-06-30
Payer: MEDICARE

## 2023-06-27 PROCEDURE — 97110 THERAPEUTIC EXERCISES: CPT

## 2023-06-27 PROCEDURE — 97140 MANUAL THERAPY 1/> REGIONS: CPT

## 2023-06-29 ENCOUNTER — HOSPITAL ENCOUNTER (OUTPATIENT)
Facility: HOSPITAL | Age: 68
Setting detail: RECURRING SERIES
End: 2023-06-29
Payer: MEDICARE

## 2023-06-29 PROCEDURE — 97530 THERAPEUTIC ACTIVITIES: CPT

## 2023-06-29 PROCEDURE — 97140 MANUAL THERAPY 1/> REGIONS: CPT

## 2023-06-29 PROCEDURE — 97110 THERAPEUTIC EXERCISES: CPT

## 2023-07-03 ENCOUNTER — HOSPITAL ENCOUNTER (OUTPATIENT)
Facility: HOSPITAL | Age: 68
Setting detail: RECURRING SERIES
Discharge: HOME | End: 2023-07-06
Payer: MEDICARE

## 2023-07-03 PROCEDURE — 97110 THERAPEUTIC EXERCISES: CPT

## 2023-07-03 PROCEDURE — 97530 THERAPEUTIC ACTIVITIES: CPT

## 2023-07-03 NOTE — PROGRESS NOTES
PHYSICAL / OCCUPATIONAL THERAPY - DAILY TREATMENT NOTE (updated )    Patient Name: Cindia Heimlich    Date: 7/3/2023    : 1955  Insurance: Payor: MEDICARE / Plan: MEDICARE PART A AND B / Product Type: *No Product type* /      Patient  verified Yes     Visit #   Current / Total 6 12   Time   In / Out 10:20 10:59   Pain   In / Out 2/10 0-1/10   Subjective Functional Status/Changes: I think we over did it last time. I was in a lot of pain and my knee down to my ankle was swollen for a day. Changes to:  Meds, Allergies, Med Hx, Sx Hx? If yes, update Summary List no       TREATMENT AREA =  Pain in right knee [M25.561]    OBJECTIVE    Therapeutic Procedures: Tx Min Billable or 1:1 Min (if diff from Tx Min) Procedure, Rationale, Specifics   Total  39 Total   [facility] Totals Reminder: bill using total billable min of TIMED therapeutic procedures (example: do not include dry needle or estim unattended, both untimed codes, in totals to left)  8-22 min = 1 unit; 23-37 min = 2 units; 38-52 min = 3 units; 53-67 min = 4 units; 68-82 min = 5 units     88700 Manual Therapy (timed):  decrease pain, increase ROM, increase tissue extensibility, decrease trigger points, and increase postural awareness to improve patient's ability to progress to PLOF and address remaining functional goals. The manual therapy interventions were performed at a separate and distinct time from the therapeutic activities interventions . (see flow sheet as applicable)     Details if applicable:    Position: supine  Technique: RIGHT knee PROM flex; patellar mobilizations    26  97996 Therapeutic Exercise (timed):  increase ROM, strength, coordination, balance, and proprioception to improve patient's ability to progress to PLOF and address remaining functional goals.  (see flow sheet as applicable)     Details if applicable:       04745 Neuromuscular Re-Education (timed):  improve balance, coordination, kinesthetic sense, posture, core stability

## 2023-07-05 ENCOUNTER — HOSPITAL ENCOUNTER (OUTPATIENT)
Facility: HOSPITAL | Age: 68
Setting detail: RECURRING SERIES
Discharge: HOME | End: 2023-07-08
Payer: MEDICARE

## 2023-07-05 PROCEDURE — 97110 THERAPEUTIC EXERCISES: CPT

## 2023-07-05 PROCEDURE — 97530 THERAPEUTIC ACTIVITIES: CPT

## 2023-07-05 NOTE — PROGRESS NOTES
PHYSICAL / OCCUPATIONAL THERAPY - DAILY TREATMENT NOTE (updated )    Patient Name: Jacques Crespo    Date: 2023    : 1955  Insurance: Payor: MEDICARE / Plan: MEDICARE PART A AND B / Product Type: *No Product type* /      Patient  verified Yes     Visit #   Current / Total 7 12   Time   In / Out 10:20 11:00   Pain   In / Out 1/10 1/10   Subjective Functional Status/Changes: Says he was on his feet all day yesterday    Changes to:  Meds, Allergies, Med Hx, Sx Hx? If yes, update Summary List no       TREATMENT AREA =  Pain in right knee [M25.561]    OBJECTIVE    Therapeutic Procedures: Tx Min Billable or 1:1 Min (if diff from Tx Min) Procedure, Rationale, Specifics   Total  40 Total   [facility] Totals Reminder: bill using total billable min of TIMED therapeutic procedures (example: do not include dry needle or estim unattended, both untimed codes, in totals to left)  8-22 min = 1 unit; 23-37 min = 2 units; 38-52 min = 3 units; 53-67 min = 4 units; 68-82 min = 5 units     91100 Manual Therapy (timed):  decrease pain, increase ROM, increase tissue extensibility, decrease trigger points, and increase postural awareness to improve patient's ability to progress to PLOF and address remaining functional goals. The manual therapy interventions were performed at a separate and distinct time from the therapeutic activities interventions . (see flow sheet as applicable)     Details if applicable:    Position: supine  Technique: RIGHT knee PROM flex; patellar mobilizations    25  46986 Therapeutic Exercise (timed):  increase ROM, strength, coordination, balance, and proprioception to improve patient's ability to progress to PLOF and address remaining functional goals.  (see flow sheet as applicable)     Details if applicable:       51282 Neuromuscular Re-Education (timed):  improve balance, coordination, kinesthetic sense, posture, core stability and proprioception to improve patient's ability to develop conscious

## 2023-07-10 ENCOUNTER — HOSPITAL ENCOUNTER (OUTPATIENT)
Facility: HOSPITAL | Age: 68
Setting detail: RECURRING SERIES
Discharge: HOME | End: 2023-07-13
Payer: MEDICARE

## 2023-07-10 PROCEDURE — 97110 THERAPEUTIC EXERCISES: CPT

## 2023-07-10 PROCEDURE — 97530 THERAPEUTIC ACTIVITIES: CPT

## 2023-07-10 NOTE — PROGRESS NOTES
PHYSICAL / OCCUPATIONAL THERAPY - DAILY TREATMENT NOTE (updated )    Patient Name: Alanna Thomas    Date: 7/10/2023    : 1955  Insurance: Payor: MEDICARE / Plan: MEDICARE PART A AND B / Product Type: *No Product type* /      Patient  verified Yes     Visit #   Current / Total 8 12   Time   In / Out 11:00 11:38   Pain   In / Out 1-2/10 1/10   Subjective Functional Status/Changes: Says in the morning the knee feels the tightest. Still feels a deep ache. Changes to:  Meds, Allergies, Med Hx, Sx Hx? If yes, update Summary List no       TREATMENT AREA =  Pain in right knee [M25.561]    OBJECTIVE    Therapeutic Procedures: Tx Min Billable or 1:1 Min (if diff from Tx Min) Procedure, Rationale, Specifics   Total  38 Total   [facility] Totals Reminder: bill using total billable min of TIMED therapeutic procedures (example: do not include dry needle or estim unattended, both untimed codes, in totals to left)  8-22 min = 1 unit; 23-37 min = 2 units; 38-52 min = 3 units; 53-67 min = 4 units; 68-82 min = 5 units     66342 Manual Therapy (timed):  decrease pain, increase ROM, increase tissue extensibility, decrease trigger points, and increase postural awareness to improve patient's ability to progress to PLOF and address remaining functional goals. The manual therapy interventions were performed at a separate and distinct time from the therapeutic activities interventions . (see flow sheet as applicable)     Details if applicable:    Position: supine  Technique: RIGHT knee PROM flex; patellar mobilizations    23  [id number] Therapeutic Exercise (timed):  increase ROM, strength, coordination, balance, and proprioception to improve patient's ability to progress to PLOF and address remaining functional goals.  (see flow sheet as applicable)     Details if applicable:       61530 Neuromuscular Re-Education (timed):  improve balance, coordination, kinesthetic sense, posture, core stability and proprioception to improve

## 2023-07-12 ENCOUNTER — HOSPITAL ENCOUNTER (OUTPATIENT)
Facility: HOSPITAL | Age: 68
Setting detail: RECURRING SERIES
Discharge: HOME | End: 2023-07-15
Payer: MEDICARE

## 2023-07-12 PROCEDURE — 97530 THERAPEUTIC ACTIVITIES: CPT

## 2023-07-12 PROCEDURE — 97110 THERAPEUTIC EXERCISES: CPT

## 2023-07-12 PROCEDURE — 97140 MANUAL THERAPY 1/> REGIONS: CPT

## 2023-07-12 NOTE — PROGRESS NOTES
PHYSICAL / OCCUPATIONAL THERAPY - DAILY TREATMENT NOTE (updated )    Patient Name: Jt Melnedez    Date: 2023    : 1955  Insurance: Payor: MEDICARE / Plan: MEDICARE PART A AND B / Product Type: *No Product type* /      Patient  verified Yes     Visit #   Current / Total    Time   In / Out 1019 AM 1101 AM   Pain   In / Out 1/10 1/10   Subjective Functional Status/Changes: Patient reports having more aching and soreness around the medial surgical portal site. Otherwise reports no significant pain, just annoying and dull. SEE PN/RC   Changes to:  Meds, Allergies, Med Hx, Sx Hx? If yes, update Summary List no       TREATMENT AREA =  Pain in right knee [M25.561]    OBJECTIVE    Therapeutic Procedures: Tx Min Billable or 1:1 Min (if diff from Tx Min) Procedure, Rationale, Specifics   Total  42 Total   [facility] Totals Reminder: bill using total billable min of TIMED therapeutic procedures (example: do not include dry needle or estim unattended, both untimed codes, in totals to left)  8-22 min = 1 unit; 23-37 min = 2 units; 38-52 min = 3 units; 53-67 min = 4 units; 68-82 min = 5 units   10  33474 Manual Therapy (timed):  decrease pain, increase ROM, increase tissue extensibility, decrease trigger points, and increase postural awareness to improve patient's ability to progress to PLOF and address remaining functional goals. The manual therapy interventions were performed at a separate and distinct time from the therapeutic activities interventions . (see flow sheet as applicable)     Details if applicable:    Position: supine  Technique: RIGHT knee PROM flex; patellar mobilizations    15  69997 Therapeutic Exercise (timed):  increase ROM, strength, coordination, balance, and proprioception to improve patient's ability to progress to PLOF and address remaining functional goals.  (see flow sheet as applicable)     Details if applicable:    [x]ROM reassessment  []Strength reassessment     05180

## 2023-07-12 NOTE — THERAPY RECERTIFICATION
[address] [facility], [place] [address],[facility] - Phone: [phone]  Fax: [phone]  CONTINUED PLAN OF CARE/RECERTIFICATION FOR PHYSICAL THERAPY          Patient Name: Oz Masterson : 1955   Treatment/Medical Diagnosis: Pain in right knee [M25.561]   Onset Date: 20 years ago    Referral Source: Nica Anthony* Start of Care [facility]): 23   Prior Hospitalization: See Medical History Provider #: [id number]   Prior Level of Function: Chronic R knee pain with prolonged standing and walking   Comorbidities: BMI over 30, Arthritis, diabetes, kidney/bladder problems   Visits from [facility]: *** Missed Visits: ***     Progress to Goals:  Improve FOTO score to 69/100 to show a significant functional change. Status at last Eval: ***  Current Status: ***  Goal Met? {Yes/No-Ex:177555}    2. Pt to report decreased max pain levels less than 2/10 for improvement in QoL. Status at last Eval: ***  Current Status: ***  Goal Met? {Yes/No-Ex:471542}    3. Pt to demonstrate 6in step down with good control for ease of stair and curb negotiation. Status at last Eval: ***  Current Status: ***  Goal Met? {Yes/No-Ex:519717}    Key Functional Changes/Progress: ***  Problem List: pain affecting function, decrease ROM, decrease strength, impaired gait/balance, decrease ADL/functional abilities, decrease activity tolerance, decrease flexibility/joint mobility, and decrease transfer abilities    Treatment Plan may include any combination of the followin Therapeutic Exercise, 06311 Neuromuscular Re-Education, 46590 Manual Therapy, 27583 Therapeutic Activity, and 28258 Self Care/Home Management  Patient Goal(s) has been updated and includes: \"***\"    Goals for this certification period include and are to be achieved in   ***  {[facility] OP WEEKS/TREATMENTS:42293}:    ***  Status at last Eval: ***  Current Status: ***  Goal Met?

## 2023-07-18 ENCOUNTER — HOSPITAL ENCOUNTER (OUTPATIENT)
Facility: HOSPITAL | Age: 68
Setting detail: RECURRING SERIES
Discharge: HOME | End: 2023-07-21
Payer: MEDICARE

## 2023-07-18 PROCEDURE — 97110 THERAPEUTIC EXERCISES: CPT

## 2023-07-18 PROCEDURE — 97530 THERAPEUTIC ACTIVITIES: CPT

## 2023-07-18 NOTE — PROGRESS NOTES
PHYSICAL / OCCUPATIONAL THERAPY - DAILY TREATMENT NOTE (updated )    Patient Name: Oz Masterson    Date: 2023    : 1955  Insurance: Payor: MEDICARE / Plan: MEDICARE PART A AND B / Product Type: *No Product type* /      Patient  verified Yes     Visit #   Current / Total 10 12   Time   In / Out 303  PM   Pain   In / Out 0/10 0/10   Subjective Functional Status/Changes: Patient reports nearly no pain yesterday despite doing all the regular stuff. Notices it in the morning when first waking up. Patient denies falls or red flags since last visit. MD follow up tomorrow (23)     TREATMENT AREA =  Pain in right knee [M25.561]    OBJECTIVE    Therapeutic Procedures: Tx Min Billable or 1:1 Min (if diff from Tx Min) Procedure, Rationale, Specifics   Total  38 Total   MC BC Totals Reminder: bill using total billable min of TIMED therapeutic procedures (example: do not include dry needle or estim unattended, both untimed codes, in totals to left)  8-22 min = 1 unit; 23-37 min = 2 units; 38-52 min = 3 units; 53-67 min = 4 units; 68-82 min = 5 units     55620 Manual Therapy (timed):  decrease pain, increase ROM, increase tissue extensibility, decrease trigger points, and increase postural awareness to improve patient's ability to progress to PLOF and address remaining functional goals. The manual therapy interventions were performed at a separate and distinct time from the therapeutic activities interventions . (see flow sheet as applicable)     Details if applicable:    Position: supine  Technique: RIGHT knee PROM flex; patellar mobilizations    28  87254 Therapeutic Exercise (timed):  increase ROM, strength, coordination, balance, and proprioception to improve patient's ability to progress to PLOF and address remaining functional goals.  (see flow sheet as applicable)     Details if applicable:    []ROM reassessment  []Strength reassessment     30775 Neuromuscular Re-Education (timed):

## 2023-07-20 ENCOUNTER — APPOINTMENT (OUTPATIENT)
Facility: HOSPITAL | Age: 68
End: 2023-07-20
Payer: MEDICARE

## 2023-07-24 ENCOUNTER — HOSPITAL ENCOUNTER (OUTPATIENT)
Facility: HOSPITAL | Age: 68
Setting detail: RECURRING SERIES
Discharge: HOME | End: 2023-07-27
Payer: MEDICARE

## 2023-07-24 PROCEDURE — 97110 THERAPEUTIC EXERCISES: CPT

## 2023-07-24 PROCEDURE — 97530 THERAPEUTIC ACTIVITIES: CPT

## 2023-07-24 NOTE — PROGRESS NOTES
PHYSICAL / OCCUPATIONAL THERAPY - DAILY TREATMENT NOTE (updated )    Patient Name: Catrina Orellana    Date: 2023    : 1955  Insurance: Payor: MEDICARE / Plan: MEDICARE PART A AND B / Product Type: *No Product type* /      Patient  verified Yes     Visit #   Current / Total 11 18   Time   In / Out 1:02PM 1:40 PM   Pain   In / Out 0-10 0-1/10   Subjective Functional Status/Changes: Says everything is good. MD thinks the pain is from scar tissue. MD thinks he should be done with therapy NV.     TREATMENT AREA =  Pain in right knee [M25.561]    OBJECTIVE    Therapeutic Procedures: Tx Min Billable or 1:1 Min (if diff from Tx Min) Procedure, Rationale, Specifics   Total  38 Total   MC BC Totals Reminder: bill using total billable min of TIMED therapeutic procedures (example: do not include dry needle or estim unattended, both untimed codes, in totals to left)  8-22 min = 1 unit; 23-37 min = 2 units; 38-52 min = 3 units; 53-67 min = 4 units; 68-82 min = 5 units     32724 Manual Therapy (timed):  decrease pain, increase ROM, increase tissue extensibility, decrease trigger points, and increase postural awareness to improve patient's ability to progress to PLOF and address remaining functional goals. The manual therapy interventions were performed at a separate and distinct time from the therapeutic activities interventions . (see flow sheet as applicable)     Details if applicable:    Position: supine  Technique: RIGHT knee PROM flex; patellar mobilizations    23  95727 Therapeutic Exercise (timed):  increase ROM, strength, coordination, balance, and proprioception to improve patient's ability to progress to PLOF and address remaining functional goals.  (see flow sheet as applicable)     Details if applicable:    []ROM reassessment  []Strength reassessment     54258 Neuromuscular Re-Education (timed):  improve balance, coordination, kinesthetic sense, posture, core stability and proprioception to improve

## 2023-07-27 ENCOUNTER — HOSPITAL ENCOUNTER (OUTPATIENT)
Facility: HOSPITAL | Age: 68
Setting detail: RECURRING SERIES
Discharge: HOME | End: 2023-07-30
Payer: MEDICARE

## 2023-07-27 PROCEDURE — 97110 THERAPEUTIC EXERCISES: CPT

## 2023-07-27 PROCEDURE — 97530 THERAPEUTIC ACTIVITIES: CPT

## 2023-07-27 NOTE — PROGRESS NOTES
PHYSICAL / OCCUPATIONAL THERAPY - DAILY TREATMENT NOTE (updated )    Patient Name: Melvi Carpio    Date: 2023    : 1955  Insurance: Payor: MEDICARE / Plan: MEDICARE PART A AND B / Product Type: *No Product type* /      Patient  verified Yes     Visit #   Current / Total 12 18   Time   In / Out 2:22 3:00   Pain   In / Out 0-1/10 0-1/10   Subjective Functional Status/Changes: Says everything is good. MD thinks the pain is from scar tissue. MD thinks he should be done with therapy NV.     TREATMENT AREA =  Pain in right knee [M25.561]    OBJECTIVE    Therapeutic Procedures: Tx Min Billable or 1:1 Min (if diff from Tx Min) Procedure, Rationale, Specifics   Total  38 Total   MC BC Totals Reminder: bill using total billable min of TIMED therapeutic procedures (example: do not include dry needle or estim unattended, both untimed codes, in totals to left)  8-22 min = 1 unit; 23-37 min = 2 units; 38-52 min = 3 units; 53-67 min = 4 units; 68-82 min = 5 units     88622 Manual Therapy (timed):  decrease pain, increase ROM, increase tissue extensibility, decrease trigger points, and increase postural awareness to improve patient's ability to progress to PLOF and address remaining functional goals. The manual therapy interventions were performed at a separate and distinct time from the therapeutic activities interventions . (see flow sheet as applicable)     Details if applicable:    Position: supine  Technique: RIGHT knee PROM flex; patellar mobilizations    30  99512 Therapeutic Exercise (timed):  increase ROM, strength, coordination, balance, and proprioception to improve patient's ability to progress to PLOF and address remaining functional goals.  (see flow sheet as applicable)     Details if applicable:    []ROM reassessment  []Strength reassessment     76317 Neuromuscular Re-Education (timed):  improve balance, coordination, kinesthetic sense, posture, core stability and proprioception to improve